# Patient Record
Sex: FEMALE | Race: WHITE | Employment: UNEMPLOYED | URBAN - METROPOLITAN AREA
[De-identification: names, ages, dates, MRNs, and addresses within clinical notes are randomized per-mention and may not be internally consistent; named-entity substitution may affect disease eponyms.]

---

## 2019-06-04 NOTE — PROGRESS NOTES
Obstructive Sleep Apnea (MARIA EUGENIA) Screening     Patient:  Dolly Gonzalez    YOB: 1975      Medical Record #:  [id number]                     Date:  6/4/2019     1. Are you a loud and/or regular snorer? []  Yes       [x] No    2. Have you been observed to gasp or stop breathing during sleep? []  Yes       [x] No    3. Do you feel tired or groggy upon awakening or do you awaken with a headache?           []  Yes       [] No    4. Are you often tired or fatigued during the wake time hours? []  Yes       [] No    5. Do you fall asleep sitting, reading, watching TV or driving? []  Yes       [] No    6. Do you often have problems with memory or concentration? []  Yes       [] No    **If patient's score is ? 3 they are considered high risk for MARIA EUGENIA. Notify the anesthesiologist of the high risk and document in focus note. Note:  If the patient's BMI is more than 35 kg m¯² , has neck circumference > 40 cm, and/or high blood pressure the risk is greater (© American [employer]nea Association, 2006).

## 2019-06-06 ENCOUNTER — ANESTHESIA EVENT (OUTPATIENT)
Dept: OPERATING ROOM | Age: 44
End: 2019-06-06
Payer: COMMERCIAL

## 2019-06-07 ENCOUNTER — ANESTHESIA (OUTPATIENT)
Dept: OPERATING ROOM | Age: 44
End: 2019-06-07
Payer: COMMERCIAL

## 2019-06-07 ENCOUNTER — HOSPITAL ENCOUNTER (OUTPATIENT)
Age: 44
Setting detail: OUTPATIENT SURGERY
Discharge: HOME | End: 2019-06-07
Attending: OTOLARYNGOLOGY | Admitting: OTOLARYNGOLOGY
Payer: COMMERCIAL

## 2019-06-07 VITALS
TEMPERATURE: 97.3 F | BODY MASS INDEX: 34.99 KG/M2 | OXYGEN SATURATION: 94 % | RESPIRATION RATE: 16 BRPM | DIASTOLIC BLOOD PRESSURE: 80 MMHG | HEART RATE: 91 BPM | WEIGHT: 210 LBS | SYSTOLIC BLOOD PRESSURE: 109 MMHG | HEIGHT: 65 IN

## 2019-06-07 VITALS
DIASTOLIC BLOOD PRESSURE: 68 MMHG | RESPIRATION RATE: 11 BRPM | SYSTOLIC BLOOD PRESSURE: 111 MMHG | OXYGEN SATURATION: 98 %

## 2019-06-07 DIAGNOSIS — Z01.818 PREOP TESTING: Primary | ICD-10-CM

## 2019-06-07 LAB — PREGNANCY, URINE: NEGATIVE

## 2019-06-07 PROCEDURE — 2709999900 HC NON-CHARGEABLE SUPPLY: Performed by: OTOLARYNGOLOGY

## 2019-06-07 PROCEDURE — 6360000002 HC RX W HCPCS: Performed by: ANESTHESIOLOGY

## 2019-06-07 PROCEDURE — 3700000001 HC ADD 15 MINUTES (ANESTHESIA): Performed by: OTOLARYNGOLOGY

## 2019-06-07 PROCEDURE — 2780000010 HC IMPLANT OTHER: Performed by: OTOLARYNGOLOGY

## 2019-06-07 PROCEDURE — 3700000000 HC ANESTHESIA ATTENDED CARE: Performed by: OTOLARYNGOLOGY

## 2019-06-07 PROCEDURE — 3600000002 HC SURGERY LEVEL 2 BASE: Performed by: OTOLARYNGOLOGY

## 2019-06-07 PROCEDURE — 6360000002 HC RX W HCPCS: Performed by: NURSE ANESTHETIST, CERTIFIED REGISTERED

## 2019-06-07 PROCEDURE — 6370000000 HC RX 637 (ALT 250 FOR IP): Performed by: ANESTHESIOLOGY

## 2019-06-07 PROCEDURE — 2500000003 HC RX 250 WO HCPCS: Performed by: NURSE ANESTHETIST, CERTIFIED REGISTERED

## 2019-06-07 PROCEDURE — 2580000003 HC RX 258: Performed by: ANESTHESIOLOGY

## 2019-06-07 PROCEDURE — 3600000012 HC SURGERY LEVEL 2 ADDTL 15MIN: Performed by: OTOLARYNGOLOGY

## 2019-06-07 PROCEDURE — 7100000000 HC PACU RECOVERY - FIRST 15 MIN: Performed by: OTOLARYNGOLOGY

## 2019-06-07 PROCEDURE — 2580000003 HC RX 258: Performed by: OTOLARYNGOLOGY

## 2019-06-07 PROCEDURE — 84703 CHORIONIC GONADOTROPIN ASSAY: CPT

## 2019-06-07 PROCEDURE — 6370000000 HC RX 637 (ALT 250 FOR IP): Performed by: OTOLARYNGOLOGY

## 2019-06-07 PROCEDURE — 7100000001 HC PACU RECOVERY - ADDTL 15 MIN: Performed by: OTOLARYNGOLOGY

## 2019-06-07 PROCEDURE — 7100000011 HC PHASE II RECOVERY - ADDTL 15 MIN: Performed by: OTOLARYNGOLOGY

## 2019-06-07 PROCEDURE — 7100000010 HC PHASE II RECOVERY - FIRST 15 MIN: Performed by: OTOLARYNGOLOGY

## 2019-06-07 DEVICE — VENT TUBE 1016020 5PK GOODE T GROM SIL
Type: IMPLANTABLE DEVICE | Site: EAR | Status: FUNCTIONAL
Brand: GOODE T-TUBE®

## 2019-06-07 RX ORDER — LABETALOL HYDROCHLORIDE 5 MG/ML
5 INJECTION, SOLUTION INTRAVENOUS
Status: DISCONTINUED | OUTPATIENT
Start: 2019-06-07 | End: 2019-06-07 | Stop reason: HOSPADM

## 2019-06-07 RX ORDER — HYDRALAZINE HYDROCHLORIDE 20 MG/ML
5 INJECTION INTRAMUSCULAR; INTRAVENOUS EVERY 30 MIN PRN
Status: DISCONTINUED | OUTPATIENT
Start: 2019-06-07 | End: 2019-06-07 | Stop reason: HOSPADM

## 2019-06-07 RX ORDER — SODIUM CHLORIDE 0.9 % (FLUSH) 0.9 %
10 SYRINGE (ML) INJECTION EVERY 12 HOURS SCHEDULED
Status: DISCONTINUED | OUTPATIENT
Start: 2019-06-07 | End: 2019-06-07 | Stop reason: HOSPADM

## 2019-06-07 RX ORDER — SODIUM CHLORIDE 0.9 % (FLUSH) 0.9 %
10 SYRINGE (ML) INJECTION PRN
Status: DISCONTINUED | OUTPATIENT
Start: 2019-06-07 | End: 2019-06-07 | Stop reason: HOSPADM

## 2019-06-07 RX ORDER — APREPITANT 40 MG/1
40 CAPSULE ORAL ONCE
Status: COMPLETED | OUTPATIENT
Start: 2019-06-07 | End: 2019-06-07

## 2019-06-07 RX ORDER — MIDAZOLAM HYDROCHLORIDE 1 MG/ML
2 INJECTION INTRAMUSCULAR; INTRAVENOUS
Status: COMPLETED | OUTPATIENT
Start: 2019-06-07 | End: 2019-06-07

## 2019-06-07 RX ORDER — OXYCODONE HYDROCHLORIDE AND ACETAMINOPHEN 5; 325 MG/1; MG/1
1 TABLET ORAL PRN
Status: COMPLETED | OUTPATIENT
Start: 2019-06-07 | End: 2019-06-07

## 2019-06-07 RX ORDER — MEPERIDINE HYDROCHLORIDE 50 MG/ML
12.5 INJECTION INTRAMUSCULAR; INTRAVENOUS; SUBCUTANEOUS EVERY 5 MIN PRN
Status: DISCONTINUED | OUTPATIENT
Start: 2019-06-07 | End: 2019-06-07 | Stop reason: HOSPADM

## 2019-06-07 RX ORDER — MAGNESIUM HYDROXIDE 1200 MG/15ML
LIQUID ORAL CONTINUOUS PRN
Status: COMPLETED | OUTPATIENT
Start: 2019-06-07 | End: 2019-06-07

## 2019-06-07 RX ORDER — ONDANSETRON 2 MG/ML
4 INJECTION INTRAMUSCULAR; INTRAVENOUS EVERY 30 MIN PRN
Status: DISCONTINUED | OUTPATIENT
Start: 2019-06-07 | End: 2019-06-07 | Stop reason: HOSPADM

## 2019-06-07 RX ORDER — ONDANSETRON 2 MG/ML
INJECTION INTRAMUSCULAR; INTRAVENOUS PRN
Status: DISCONTINUED | OUTPATIENT
Start: 2019-06-07 | End: 2019-06-07 | Stop reason: SDUPTHER

## 2019-06-07 RX ORDER — OXYCODONE HYDROCHLORIDE AND ACETAMINOPHEN 5; 325 MG/1; MG/1
2 TABLET ORAL PRN
Status: COMPLETED | OUTPATIENT
Start: 2019-06-07 | End: 2019-06-07

## 2019-06-07 RX ORDER — SODIUM CHLORIDE, SODIUM LACTATE, POTASSIUM CHLORIDE, CALCIUM CHLORIDE 600; 310; 30; 20 MG/100ML; MG/100ML; MG/100ML; MG/100ML
INJECTION, SOLUTION INTRAVENOUS CONTINUOUS
Status: DISCONTINUED | OUTPATIENT
Start: 2019-06-07 | End: 2019-06-07 | Stop reason: HOSPADM

## 2019-06-07 RX ORDER — DIPHENHYDRAMINE HYDROCHLORIDE 50 MG/ML
6.25 INJECTION INTRAMUSCULAR; INTRAVENOUS
Status: DISCONTINUED | OUTPATIENT
Start: 2019-06-07 | End: 2019-06-07 | Stop reason: HOSPADM

## 2019-06-07 RX ORDER — LIDOCAINE HYDROCHLORIDE 20 MG/ML
INJECTION, SOLUTION INFILTRATION; PERINEURAL PRN
Status: DISCONTINUED | OUTPATIENT
Start: 2019-06-07 | End: 2019-06-07 | Stop reason: SDUPTHER

## 2019-06-07 RX ORDER — LIDOCAINE HYDROCHLORIDE 10 MG/ML
1 INJECTION, SOLUTION EPIDURAL; INFILTRATION; INTRACAUDAL; PERINEURAL
Status: DISCONTINUED | OUTPATIENT
Start: 2019-06-07 | End: 2019-06-07 | Stop reason: HOSPADM

## 2019-06-07 RX ORDER — CIPROFLOXACIN AND DEXAMETHASONE 3; 1 MG/ML; MG/ML
SUSPENSION/ DROPS AURICULAR (OTIC) PRN
Status: DISCONTINUED | OUTPATIENT
Start: 2019-06-07 | End: 2019-06-07 | Stop reason: ALTCHOICE

## 2019-06-07 RX ORDER — PROPOFOL 10 MG/ML
INJECTION, EMULSION INTRAVENOUS PRN
Status: DISCONTINUED | OUTPATIENT
Start: 2019-06-07 | End: 2019-06-07 | Stop reason: SDUPTHER

## 2019-06-07 RX ORDER — ACETAMINOPHEN 500 MG
1000 TABLET ORAL ONCE
Status: COMPLETED | OUTPATIENT
Start: 2019-06-07 | End: 2019-06-07

## 2019-06-07 RX ADMIN — OXYCODONE HYDROCHLORIDE AND ACETAMINOPHEN 1 TABLET: 5; 325 TABLET ORAL at 08:14

## 2019-06-07 RX ADMIN — ONDANSETRON 4 MG: 2 INJECTION INTRAMUSCULAR; INTRAVENOUS at 07:18

## 2019-06-07 RX ADMIN — MIDAZOLAM HYDROCHLORIDE 2 MG: 2 INJECTION, SOLUTION INTRAMUSCULAR; INTRAVENOUS at 07:15

## 2019-06-07 RX ADMIN — ACETAMINOPHEN 1000 MG: 500 TABLET ORAL at 06:52

## 2019-06-07 RX ADMIN — HYDROMORPHONE HYDROCHLORIDE 0.5 MG: 1 INJECTION, SOLUTION INTRAMUSCULAR; INTRAVENOUS; SUBCUTANEOUS at 08:16

## 2019-06-07 RX ADMIN — APREPITANT 40 MG: 40 CAPSULE ORAL at 06:52

## 2019-06-07 RX ADMIN — LIDOCAINE HYDROCHLORIDE 40 MG: 20 INJECTION, SOLUTION INFILTRATION; PERINEURAL at 07:18

## 2019-06-07 RX ADMIN — PROPOFOL 200 MG: 10 INJECTION, EMULSION INTRAVENOUS at 07:18

## 2019-06-07 RX ADMIN — HYDROMORPHONE HYDROCHLORIDE 0.5 MG: 1 INJECTION, SOLUTION INTRAMUSCULAR; INTRAVENOUS; SUBCUTANEOUS at 08:03

## 2019-06-07 RX ADMIN — SODIUM CHLORIDE, POTASSIUM CHLORIDE, SODIUM LACTATE AND CALCIUM CHLORIDE: 600; 310; 30; 20 INJECTION, SOLUTION INTRAVENOUS at 06:44

## 2019-06-07 ASSESSMENT — PULMONARY FUNCTION TESTS
PIF_VALUE: 1
PIF_VALUE: 10
PIF_VALUE: 7
PIF_VALUE: 24
PIF_VALUE: 0
PIF_VALUE: 14
PIF_VALUE: 14
PIF_VALUE: 0
PIF_VALUE: 13
PIF_VALUE: 3
PIF_VALUE: 15
PIF_VALUE: 13
PIF_VALUE: 24
PIF_VALUE: 14
PIF_VALUE: 1
PIF_VALUE: 1
PIF_VALUE: 17
PIF_VALUE: 25
PIF_VALUE: 2
PIF_VALUE: 23
PIF_VALUE: 16
PIF_VALUE: 12
PIF_VALUE: 12
PIF_VALUE: 0

## 2019-06-07 ASSESSMENT — PAIN SCALES - GENERAL
PAINLEVEL_OUTOF10: 8
PAINLEVEL_OUTOF10: 0
PAINLEVEL_OUTOF10: 8
PAINLEVEL_OUTOF10: 9

## 2019-06-07 ASSESSMENT — PAIN - FUNCTIONAL ASSESSMENT: PAIN_FUNCTIONAL_ASSESSMENT: 0-10

## 2019-06-07 NOTE — H&P
H&P UPDATE    This is a 45 yo WF with a life long history of chronic ETD with recurrent serous otitis media and conductive hearing loss. She has had multiple sets of tubes in the past.  She presents for bilateral myringotomy and T-tube placement. Her ENT exam revealed left tympanic membrane retraction with CHICO and right with dull TM, reduced pressure volume and compliance with CHL of middle ear. Patient denies any change in medical history or physical exam (iincluding cardiopulmonary) since H&P by PCP.   Procedure, risks and benefits were discussed with patient and permit signed

## 2019-06-07 NOTE — OP NOTE
[facility]                                OPERATIVE REPORT    PATIENT NAME: Yordan Miranda                    :        1975  MED REC NO:   [id number]                          ROOM:  ACCOUNT NO:   [de-identified]                           ADMIT DATE: 2019  PROVIDER:     Davidson Mcelroy MD    DATE OF PROCEDURE:  2019    PREOPERATIVE DIAGNOSES:  Chronic eustachian tube dysfunction with  recurrent serous otitis media and conductive hearing loss, bilateral.    POSTOPERATIVE DIAGNOSES:  Chronic eustachian tube dysfunction with  recurrent serous otitis media and conductive hearing loss, bilateral.    SURGICAL PROCEDURE:  Bilateral myringotomy with T-tube placement. COMPLICATIONS:  None. INDICATIONS FOR SURGICAL PROCEDURE:  This is a 45-year-old white female  who has had a lifetime history of chronic eustachian tube dysfunction  and recurrent otitis media, recurrent serous otitis with conductive  hearing loss as well as multiple tympanostomy tubes. She presents at  this time for bilateral myringotomy and T-tube placement. OPERATIVE PROCEDURE:  The patient was taken to the operating room and  placed on the operating table in the supine position, where she  underwent induction of general anesthesia without complication. The  right ear was examined and the patient was prepped and draped in the  usual sterile fashion. The right ear was examined using an  intraoperative microscope and revealed retraction of the tympanic  membrane. There was a monomeric area in the posterior inferior  quadrant. A radial incision was made in the posterior inferior quadrant  with a myringotomy knife. No middle ear effusion was present. The  T-tube was placed without difficulty. Next, attention was directed to the left ear. The left ear examination  with the intraoperative microscope revealed tympanosclerosis.   A radial  incision was made in the posterior inferior quadrant with a myringotomy  knife revealed a serous middle ear effusion. A Dickson T-tube was  placed without difficulty and Ciprodex otic drops was instilled in both  the ears. The patient tolerated the procedure well without complications and less  than a mL of blood loss.         Yuliana Joiner MD    D: 06/07/2019 9:09:50       T: 06/07/2019 11:44:16     [email or web address]  Job#: [id number]     Doc#: [id number]    CC:

## 2019-06-07 NOTE — ANESTHESIA PRE PROCEDURE
Department of Anesthesiology  Preprocedure Note       Name:  Tato Delgado   Age:  44 y.o.  :  1975                                          MRN:  [id number]         Date:  2019      Surgeon: Stella Yung):  Glenn Chua MD    Procedure: BILATERAL MYRINGOTOMY WITH T-TUBE INSERTION (Bilateral Ear)    Medications prior to admission:   Prior to Admission medications    Medication Sig Start Date End Date Taking?  Authorizing Provider   metoprolol tartrate (LOPRESSOR) 25 MG tablet Take 50 mg by mouth nightly   Yes Historical Provider, MD   ALPRAZolam (XANAX) 0.25 MG tablet Take 0.25 mg by mouth nightly as needed for Sleep   Yes Historical Provider, MD   venlafaxine (EFFEXOR) 75 MG tablet Take 75 mg by mouth daily    Yes Historical Provider, MD   OnabotulinumtoxinA (BOTOX IJ) Inject as directed    Historical Provider, MD       Current medications:    Current Facility-Administered Medications   Medication Dose Route Frequency Provider Last Rate Last Dose    lactated ringers infusion   Intravenous Continuous Walt Mcbride  mL/hr at 19 0644      sodium chloride flush 0.9 % injection 10 mL  10 mL Intravenous 2 times per day Walt Mcbride MD        sodium chloride flush 0.9 % injection 10 mL  10 mL Intravenous PRN Walt Mcbride MD        lidocaine PF 1 % injection 1 mL  1 mL Intradermal Once PRN Walt Mcbride MD        HYDROmorphone (DILAUDID) injection 0.5 mg  0.5 mg Intravenous Q10 Min PRN Charles Stiles MD        HYDROmorphone (DILAUDID) injection 0.5 mg  0.5 mg Intravenous Q5 Min PRN Charles Stiles MD        oxyCODONE-acetaminophen (PERCOCET) 5-325 MG per tablet 1 tablet  1 tablet Oral PRN Charles Stiles MD        Or    oxyCODONE-acetaminophen (PERCOCET) 5-325 MG per tablet 2 tablet  2 tablet Oral PRN Charles Stiles MD        diphenhydrAMINE (BENADRYL) injection 6.25 mg  6.25 mg Intravenous Once PRN Charles Stiles MD        ondansetron [facility] injection 4 mg  4 mg Intravenous Q30 Min PRN Lionell Osler, MD        labetalol (NORMODYNE;TRANDATE) injection 5 mg  5 mg Intravenous Q15 Min PRN Lionell Osler, MD        hydrALAZINE (APRESOLINE) injection 5 mg  5 mg Intravenous Q30 Min PRN Lionell Osler, MD        meperidine (DEMEROL) injection 12.5 mg  12.5 mg Intravenous Q5 Min PRN Lionell Osler, MD        sodium chloride 0.9 % irrigation    Continuous PRN Treasure Zarate MD   5 mL at 19 2843     Facility-Administered Medications Ordered in Other Encounters   Medication Dose Route Frequency Provider Last Rate Last Dose    propofol injection    PRN Victor Manuel Earnest, APRN - CRNA   200 mg at 19 0718    lidocaine 2 % injection    PRN Victor Manuel Earnest, APRN - CRNA   40 mg at 19 0718    ondansetron (ZOFRAN) injection    PRN Victor Manuel Earnest, APRN - CRNA   4 mg at 19 5306       Allergies:  No Known Allergies    Problem List:  There is no problem list on file for this patient.       Past Medical History:        Diagnosis Date    Anxiety     Clonic hemifacial spasm of muscle of left side of face     Migraines        Past Surgical History:        Procedure Laterality Date     SECTION      CHOLECYSTECTOMY      KNEE ARTHROSCOPY Right     TYMPANOSTOMY TUBE PLACEMENT      numerous       Social History:    Social History     Tobacco Use    Smoking status: Former Smoker     Last attempt to quit: 2015     Years since quittin.0    Smokeless tobacco: Never Used    Tobacco comment: social   Substance Use Topics    Alcohol use: Yes     Comment: rarely                                Counseling given: Not Answered  Comment: social      Vital Signs (Current):   Vitals:    19 1155 19 0622   BP:  (!) 133/91   Pulse:  90   Resp:  16   Temp:  97.2 °F (36.2 °C)   TempSrc:  Temporal   SpO2:  95%   Weight: 210 lb (95.3 kg) 210 lb (95.3 kg)   Height: 5' 5\" (1.651 m) 5' 5\" (1.651 m) BP Readings from Last 3 Encounters:   06/07/19 (!) 133/91   03/17/17 134/87       NPO Status: Time of last liquid consumption: 1900                        Time of last solid consumption: 1900                        Date of last liquid consumption: 06/06/19                        Date of last solid food consumption: 06/06/19    BMI:   Wt Readings from Last 3 Encounters:   06/07/19 210 lb (95.3 kg)   03/17/17 210 lb (95.3 kg)     Body mass index is 34.95 kg/m². CBC:   Lab Results   Component Value Date    WBC 13.4 03/17/2017    RBC 4.78 03/17/2017    HGB 14.3 03/17/2017    HCT 42.8 03/17/2017    MCV 89.7 03/17/2017    RDW 13.5 03/17/2017     03/17/2017       CMP:   Lab Results   Component Value Date     03/17/2017    K 3.3 03/17/2017    CL 96 03/17/2017    CO2 22 03/17/2017    BUN 7 03/17/2017    CREATININE 0.6 03/17/2017    GFRAA >60 03/17/2017    AGRATIO 1.2 03/17/2017    LABGLOM >60 03/17/2017    GLUCOSE 108 03/17/2017    PROT 7.7 03/17/2017    CALCIUM 8.4 03/17/2017    BILITOT 0.5 03/17/2017    ALKPHOS 85 03/17/2017    AST 21 03/17/2017    ALT 15 03/17/2017       POC Tests: No results for input(s): POCGLU, POCNA, POCK, POCCL, POCBUN, POCHEMO, POCHCT in the last 72 hours. Coags: No results found for: PROTIME, INR, APTT    HCG (If Applicable):   Lab Results   Component Value Date    PREGTESTUR Negative 03/17/2017        ABGs: No results found for: PHART, PO2ART, CLP0XKQ, NNB9YDZ, BEART, D9TKDKNU     Type & Screen (If Applicable):  No results found for: LABABO, [address]    Anesthesia Evaluation  Patient summary reviewed and Nursing notes reviewed no history of anesthetic complications:   Airway: Mallampati: II     Neck ROM: full   Dental:          Pulmonary:                              Cardiovascular:                      Neuro/Psych:   (+) headaches:,             GI/Hepatic/Renal:            ROS comment: obesity.    Endo/Other:                     Abdominal: Vascular:                                        Anesthesia Plan      general     ASA 2       Induction: intravenous. Anesthetic plan and risks discussed with patient. Plan discussed with CRNA.                   Jaime Bangura MD   6/7/2019

## 2021-01-04 ENCOUNTER — APPOINTMENT (OUTPATIENT)
Dept: GENERAL RADIOLOGY | Age: 46
End: 2021-01-04
Payer: COMMERCIAL

## 2021-01-04 ENCOUNTER — HOSPITAL ENCOUNTER (EMERGENCY)
Age: 46
Discharge: HOME | End: 2021-01-04
Payer: COMMERCIAL

## 2021-01-04 VITALS
RESPIRATION RATE: 19 BRPM | HEART RATE: 93 BPM | BODY MASS INDEX: 34.99 KG/M2 | DIASTOLIC BLOOD PRESSURE: 94 MMHG | SYSTOLIC BLOOD PRESSURE: 136 MMHG | TEMPERATURE: 97.9 F | WEIGHT: 210 LBS | HEIGHT: 65 IN | OXYGEN SATURATION: 100 %

## 2021-01-04 DIAGNOSIS — R53.83 MALAISE AND FATIGUE: ICD-10-CM

## 2021-01-04 DIAGNOSIS — R53.81 MALAISE AND FATIGUE: ICD-10-CM

## 2021-01-04 DIAGNOSIS — U07.1 PNEUMONIA DUE TO COVID-19 VIRUS: Primary | ICD-10-CM

## 2021-01-04 DIAGNOSIS — R06.09 DOE (DYSPNEA ON EXERTION): ICD-10-CM

## 2021-01-04 DIAGNOSIS — J12.82 PNEUMONIA DUE TO COVID-19 VIRUS: Primary | ICD-10-CM

## 2021-01-04 LAB
A/G RATIO: 1.3 (ref 1.1–2.2)
ALBUMIN SERPL-MCNC: 4.4 G/DL (ref 3.4–5)
ALP BLD-CCNC: 87 U/L (ref 40–129)
ALT SERPL-CCNC: 11 U/L (ref 10–40)
ANION GAP SERPL CALCULATED.3IONS-SCNC: 11 MMOL/L (ref 3–16)
AST SERPL-CCNC: 20 U/L (ref 15–37)
BASOPHILS ABSOLUTE: 0 K/UL (ref 0–0.2)
BASOPHILS RELATIVE PERCENT: 0.4 %
BILIRUB SERPL-MCNC: 0.3 MG/DL (ref 0–1)
BUN BLDV-MCNC: 12 MG/DL (ref 7–20)
CALCIUM SERPL-MCNC: 8.6 MG/DL (ref 8.3–10.6)
CHLORIDE BLD-SCNC: 101 MMOL/L (ref 99–110)
CO2: 22 MMOL/L (ref 21–32)
CREAT SERPL-MCNC: 0.7 MG/DL (ref 0.6–1.1)
EOSINOPHILS ABSOLUTE: 0 K/UL (ref 0–0.6)
EOSINOPHILS RELATIVE PERCENT: 0.1 %
GFR AFRICAN AMERICAN: >60
GFR NON-AFRICAN AMERICAN: >60
GLOBULIN: 3.5 G/DL
GLUCOSE BLD-MCNC: 98 MG/DL (ref 70–99)
HCT VFR BLD CALC: 42.3 % (ref 36–48)
HEMOGLOBIN: 13.5 G/DL (ref 12–16)
LYMPHOCYTES ABSOLUTE: 1.7 K/UL (ref 1–5.1)
LYMPHOCYTES RELATIVE PERCENT: 28.3 %
MCH RBC QN AUTO: 25.5 PG (ref 26–34)
MCHC RBC AUTO-ENTMCNC: 32 G/DL (ref 31–36)
MCV RBC AUTO: 79.5 FL (ref 80–100)
MONOCYTES ABSOLUTE: 0.6 K/UL (ref 0–1.3)
MONOCYTES RELATIVE PERCENT: 9.9 %
NEUTROPHILS ABSOLUTE: 3.7 K/UL (ref 1.7–7.7)
NEUTROPHILS RELATIVE PERCENT: 61.3 %
PDW BLD-RTO: 17 % (ref 12.4–15.4)
PLATELET # BLD: 246 K/UL (ref 135–450)
PMV BLD AUTO: 7.7 FL (ref 5–10.5)
POTASSIUM SERPL-SCNC: 3.6 MMOL/L (ref 3.5–5.1)
RBC # BLD: 5.31 M/UL (ref 4–5.2)
SODIUM BLD-SCNC: 134 MMOL/L (ref 136–145)
TOTAL PROTEIN: 7.9 G/DL (ref 6.4–8.2)
TROPONIN: <0.01 NG/ML
WBC # BLD: 6.1 K/UL (ref 4–11)

## 2021-01-04 PROCEDURE — 93005 ELECTROCARDIOGRAM TRACING: CPT | Performed by: EMERGENCY MEDICINE

## 2021-01-04 PROCEDURE — 84484 ASSAY OF TROPONIN QUANT: CPT

## 2021-01-04 PROCEDURE — 96374 THER/PROPH/DIAG INJ IV PUSH: CPT

## 2021-01-04 PROCEDURE — 80053 COMPREHEN METABOLIC PANEL: CPT

## 2021-01-04 PROCEDURE — 71045 X-RAY EXAM CHEST 1 VIEW: CPT

## 2021-01-04 PROCEDURE — 85025 COMPLETE CBC W/AUTO DIFF WBC: CPT

## 2021-01-04 PROCEDURE — 6360000002 HC RX W HCPCS: Performed by: PHYSICIAN ASSISTANT

## 2021-01-04 PROCEDURE — 99285 EMERGENCY DEPT VISIT HI MDM: CPT

## 2021-01-04 RX ORDER — DEXAMETHASONE SODIUM PHOSPHATE 10 MG/ML
8 INJECTION INTRAMUSCULAR; INTRAVENOUS ONCE
Status: COMPLETED | OUTPATIENT
Start: 2021-01-04 | End: 2021-01-04

## 2021-01-04 RX ADMIN — DEXAMETHASONE SODIUM PHOSPHATE 8 MG: 10 INJECTION INTRAMUSCULAR; INTRAVENOUS at 22:33

## 2021-01-04 ASSESSMENT — ENCOUNTER SYMPTOMS
VOMITING: 0
ABDOMINAL PAIN: 0
BACK PAIN: 0
SHORTNESS OF BREATH: 1
COUGH: 1
NAUSEA: 0
COLOR CHANGE: 0
EYES NEGATIVE: 1

## 2021-01-04 ASSESSMENT — PAIN DESCRIPTION - LOCATION: LOCATION: CHEST

## 2021-01-04 ASSESSMENT — PAIN DESCRIPTION - DESCRIPTORS: DESCRIPTORS: PRESSURE

## 2021-01-05 ENCOUNTER — CARE COORDINATION (OUTPATIENT)
Dept: CARE COORDINATION | Age: 46
End: 2021-01-05

## 2021-01-05 LAB
EKG ATRIAL RATE: 88 BPM
EKG DIAGNOSIS: NORMAL
EKG P AXIS: 24 DEGREES
EKG P-R INTERVAL: 116 MS
EKG Q-T INTERVAL: 370 MS
EKG QRS DURATION: 74 MS
EKG QTC CALCULATION (BAZETT): 447 MS
EKG R AXIS: 26 DEGREES
EKG T AXIS: 34 DEGREES
EKG VENTRICULAR RATE: 88 BPM

## 2021-01-05 PROCEDURE — 93010 ELECTROCARDIOGRAM REPORT: CPT | Performed by: INTERNAL MEDICINE

## 2021-01-05 NOTE — ED PROVIDER NOTES
I did not have face-to-face interaction with this patient. I did not discuss the case with the advanced practice provider. I was available for consultation on the patient if deemed necessary by advanced practice provider. EKG  The Ekg interpreted by me shows  normal sinus rhythm with a rate of 88  Axis is   Normal  QTc is  normal  Intervals and Durations are unremarkable. ST Segments: normal  No prior EKG available for comparison.              Paco Soria MD  01/04/21 0683

## 2021-01-05 NOTE — CARE COORDINATION
Patient contacted regarding recent visit for viral symptoms. This [facility] contacted the patient by telephone to perform post discharge call. Call within 2 business days of discharge: Yes    Unable to leave message requesting a return call, VM box full. Will attempt to reach pt again.

## 2021-01-05 NOTE — ED PROVIDER NOTES
[facility]  ED  EMERGENCY DEPARTMENT ENCOUNTER        Pt Name: Krysten Arellano  MRN: [id number]  [place] 1975  Date of evaluation: 1/4/2021  Provider: Blayne Baxter PA-C  PCP: Mikala Restrepo MD  ED Attending: Edilia Jimenez      This patient was not seen by the attending provider     History provided by the patient    CHIEF COMPLAINT:     Chief Complaint   Patient presents with    Chest Pain     pain in middle of chest for 2-3 days, states HR has been elevated at home. 130-140 at highest.       HISTORY OF PRESENT ILLNESS:      Krysten Arellano is a 41 y.o. female who arrives to the ED by private vehicle. Patient is here with known COVID-19 infection. She is had symptoms for approximately 1 week and tested positive last Wednesday, 12/30. Patient reports she has not had a fever today, that has improved. However she continues to experience fatigue, generalized weakness, shortness of breath with activity and coughing. She reports some pain in her chest for the last couple of days and wanted to be evaluated. Patient reports no significant past medical history. She takes no medications routinely. She is a non-smoker. The patient symptoms are exacerbated with activity and slightly alleviated at rest.    Nursing Notes were reviewed     REVIEW OF SYSTEMS:     Review of Systems   Constitutional: Positive for activity change, appetite change and fatigue. Negative for chills and fever. HENT: Negative. Eyes: Negative. Respiratory: Positive for cough and shortness of breath. Cardiovascular: Positive for chest pain. Gastrointestinal: Negative for abdominal pain, nausea and vomiting. Genitourinary: Negative. Musculoskeletal: Negative for back pain and neck pain. Skin: Negative for color change. Neurological: Negative for dizziness and headaches. All other systems reviewed and are negative. Except as noted above in the ROS, all other systems were reviewed and negative. PAST MEDICAL HISTORY:     Past Medical History:   Diagnosis Date    Anxiety     Clonic hemifacial spasm of muscle of left side of face     Migraines          SURGICAL HISTORY:      Past Surgical History:   Procedure Laterality Date     SECTION      CHOLECYSTECTOMY      KNEE ARTHROSCOPY Right     MYRINGOTOMY AND TYMPANOSTOMY TUBE PLACEMENT Bilateral 2019    BILATERAL MYRINGOTOMY WITH T-TUBE INSERTION performed by Fransisco Hand MD at [address]      numerous         CURRENT MEDICATIONS:       Discharge Medication List as of 2021 10:51 PM      CONTINUE these medications which have NOT CHANGED    Details   metoprolol tartrate (LOPRESSOR) 25 MG tablet Take 50 mg by mouth nightlyHistorical Med      ALPRAZolam (XANAX) 0.25 MG tablet Take 0.25 mg by mouth nightly as needed for SleepHistorical Med      venlafaxine (EFFEXOR) 75 MG tablet Take 75 mg by mouth daily Historical Med      OnabotulinumtoxinA (BOTOX IJ) Inject as directedHistorical Med               ALLERGIES:    Patient has no known allergies.     FAMILY HISTORY:       Family History   Problem Relation Age of Onset    High Blood Pressure Mother     Other Mother         brain tumor - benign    Heart Disease Father         heart valve replacement          SOCIAL HISTORY:       Social History     Socioeconomic History    Marital status:      Spouse name: None    Number of children: None    Years of education: None    Highest education level: None   Occupational History    None   Social Needs    Financial resource strain: None    Food insecurity     Worry: None     Inability: None    Transportation needs     Medical: None     Non-medical: None   Tobacco Use    Smoking status: Former Smoker     Quit date: 2015     Years since quittin.5    Smokeless tobacco: Never Used    Tobacco comment: social   Substance and Sexual Activity    Alcohol use: Yes     Comment: rarely  Drug use: Never    Sexual activity: None   Lifestyle    Physical activity     Days per week: None     Minutes per session: None    Stress: None   Relationships    Social connections     Talks on phone: None     Gets together: None     Attends Synagogue service: None     Active member of club or organization: None     Attends meetings of clubs or organizations: None     Relationship status: None    Intimate partner violence     Fear of current or ex partner: None     Emotionally abused: None     Physically abused: None     Forced sexual activity: None   Other Topics Concern    None   Social History Narrative    None       SCREENINGS:    Marshall Coma Scale  Eye Opening: Spontaneous  Best Verbal Response: Oriented  Best Motor Response: Obeys commands  Rosa Coma Scale Score: 15        PHYSICAL EXAM:       ED Triage Vitals [01/04/21 1815]   BP Temp Temp Source Pulse Resp SpO2 Height Weight   (!) 143/99 97.9 °F (36.6 °C) Oral 95 18 98 % 5' 5\" (1.651 m) 210 lb (95.3 kg)       Physical Exam    CONSTITUTIONAL: Awake and alert. Cooperative. Well-developed. Well-nourished. Non-toxic. No acute distress. HENT: Normocephalic. Atraumatic. External ears normal, without discharge. No nasal discharge. Oropharynx clear. Mucous membranes moist.  EYES: Conjunctiva non-injected. No scleral icterus. PERRL. EOM's grossly intact. NECK: Supple. Normal ROM. CARDIOVASCULAR: RRR. No Murmer. Intact distal pulses. PULMONARY/CHEST WALL: Effort normal. No tachypnea. Lungs clear to ausculation. ABDOMEN: Normal BS. Soft. Nondistended. No tenderness to palpate. No guarding. /ANORECTAL: Not assessed  MUSKULOSKELETAL: Normal ROM. No acute deformities. No edema. No tenderness to palpate. SKIN: Warm and dry. No rash. NEUROLOGICAL: Alert and oriented x 3. GCS 15. CN II-XII grossly intact. Strength is 5/5 in all extremities and sensation is intact. Normal gait.   PSYCHIATRIC: Normal affect        DIAGNOSTICRESULTS: pneumothorax or pleural effusion. Elevated right hemidiaphragm with linear opacity at the right lung base possibly representing subsegmental atelectasis or pneumonia         EKG: The Ekg interpreted by me in the absence of a cardiologist shows. normal sinus rhythm with a rate of 88    See also interpretation by Nova Hurley MD      PROCEDURES:   N/A    CRITICAL CARE TIME:       None      CONSULTS:  None      EMERGENCY DEPARTMENT COURSE and DIFFERENTIAL DIAGNOSIS/MDM:   Vitals:    Vitals:    01/04/21 1815 01/04/21 2154 01/04/21 2226   BP: (!) 143/99 (!) 148/97 (!) 136/94   Pulse: 95 92 93   Resp: 18 18 19   Temp: 97.9 °F (36.6 °C)     TempSrc: Oral     SpO2: 98% 99% 100%   Weight: 210 lb (95.3 kg)     Height: 5' 5\" (1.651 m)         Patient was given the following medications:  Medications   dexamethasone (DECADRON) injection 8 mg (8 mg Intravenous Given 1/4/21 2233)       I have evaluated this patient in the ED  Old records were reviewed. Patient arrives to the ED with known COVID-19 infection. He has been sick for about a week. Her fevers are improving but she continues to have cough, chest tightness and shortness of breath and chest pain. Symptoms are worse with activity and she reports breaking out in a sweat at times. Patient appears well while sitting in the bed. Vital signs are normal.  She is not tachycardic, tachypneic or hypoxic. In fact oxygen saturations are 100% on room air. CBC normal with WBC 6.1, H&H 13.5 and 42.3  CMP unremarkable  Troponin negative  Portable chest x-ray shows elevated right hemidiaphragm with linear opacity at the right lung base possibly representing subsegmental atelectasis or pneumonia  EKG shows sinus rhythm with rate 88 bpm  Patient again has remained stable here. We ambulated her in the hallway and she did not become tachycardic or hypoxic. She is given Decadron 8 mg IV but ultimately will be discharged home to continue monitoring her symptoms.   Return precautions discussed. I estimate there is LOW risk for BACTERIAL PNEUMONIA, PNEUMOTHORAX, PULMONARY EMBOLISM, ACUTE CORONARY SYNDROME, OR THORACIC AORTIC DISSECTION, thus I consider the discharge disposition reasonable. [id number] [place] In[place]enc[place] and I have discussed the diagnosis and risks, and we agree with discharging home to follow-up with their primary doctor. We also discussed returning to the Emergency Department immediately if new or worsening symptoms occur. We have discussed the symptoms which are most concerning (e.g., bloody sputum, fever, worsening pain or shortness of breath, vomiting) that necessitate immediate return. FINAL IMPRESSION:      1. Pneumonia due to COVID-19 virus    2. CALDERON (dyspnea on exertion)    3.  Malaise and fatigue          DISPOSITION/PLAN:   DISPOSITION     DISCHARGE    PATIENT REFERRED TO:  Benita Tariq  [phone]    Schedule an appointment as soon as possible for a visit       [facility]  ED  [address]  Go to   If symptoms worsen      DISCHARGE MEDICATIONS:  Discharge Medication List as of 1/4/2021 10:51 PM                     (Please note thatportions of this note were completed with a voice recognition program.  Efforts were made to edit the dictations, but occasionally words are mis-transcribed.)    Oralia Dinero PA-C (electronicallysigned)              Tristan Medranoma  01/04/21 2893

## 2021-01-05 NOTE — ED NOTES
Pt ambulated approximately 250 feet around unit. Pt tolerated well. SpO2 remained % on room air, -110.       Almita Lal RN  01/04/21 1949

## 2021-01-06 NOTE — CARE COORDINATION
Patient contacted regarding recent visit for viral symptoms. This [facility] contacted the patient by telephone to perform post discharge call. Call within 2 business days of discharge: Yes    Left HIPAA compliant message for spouse, Sameer Whitley requesting a return call. Pt mobile phones VM box is full. No further attempts will be made, will wait for a return call.

## 2021-08-28 ENCOUNTER — APPOINTMENT (OUTPATIENT)
Dept: GENERAL RADIOLOGY | Age: 46
End: 2021-08-28
Payer: COMMERCIAL

## 2021-08-28 ENCOUNTER — HOSPITAL ENCOUNTER (EMERGENCY)
Age: 46
Discharge: HOME | End: 2021-08-28
Attending: STUDENT IN AN ORGANIZED HEALTH CARE EDUCATION/TRAINING PROGRAM
Payer: COMMERCIAL

## 2021-08-28 VITALS
BODY MASS INDEX: 36.32 KG/M2 | WEIGHT: 218 LBS | OXYGEN SATURATION: 100 % | HEIGHT: 65 IN | HEART RATE: 79 BPM | RESPIRATION RATE: 18 BRPM | TEMPERATURE: 98.5 F | SYSTOLIC BLOOD PRESSURE: 185 MMHG | DIASTOLIC BLOOD PRESSURE: 119 MMHG

## 2021-08-28 DIAGNOSIS — G43.009 MIGRAINE WITHOUT AURA AND WITHOUT STATUS MIGRAINOSUS, NOT INTRACTABLE: ICD-10-CM

## 2021-08-28 DIAGNOSIS — R07.89 CHEST PRESSURE: ICD-10-CM

## 2021-08-28 DIAGNOSIS — I10 ESSENTIAL HYPERTENSION: Primary | ICD-10-CM

## 2021-08-28 LAB
ANION GAP SERPL CALCULATED.3IONS-SCNC: 11 MMOL/L (ref 3–16)
ATYPICAL LYMPHOCYTE RELATIVE PERCENT: 1 % (ref 0–6)
BASOPHILS ABSOLUTE: 0 K/UL (ref 0–0.2)
BASOPHILS RELATIVE PERCENT: 0 %
BILIRUBIN URINE: NEGATIVE
BLOOD, URINE: NEGATIVE
BUN BLDV-MCNC: 19 MG/DL (ref 7–20)
CALCIUM SERPL-MCNC: 9.2 MG/DL (ref 8.3–10.6)
CHLORIDE BLD-SCNC: 102 MMOL/L (ref 99–110)
CLARITY: CLEAR
CO2: 26 MMOL/L (ref 21–32)
COLOR: YELLOW
CREAT SERPL-MCNC: 0.8 MG/DL (ref 0.6–1.1)
EOSINOPHILS ABSOLUTE: 0 K/UL (ref 0–0.6)
EOSINOPHILS RELATIVE PERCENT: 0 %
GFR AFRICAN AMERICAN: >60
GFR NON-AFRICAN AMERICAN: >60
GLUCOSE BLD-MCNC: 99 MG/DL (ref 70–99)
GLUCOSE URINE: NEGATIVE MG/DL
HCT VFR BLD CALC: 41.8 % (ref 36–48)
HEMOGLOBIN: 14 G/DL (ref 12–16)
KETONES, URINE: NEGATIVE MG/DL
LEUKOCYTE ESTERASE, URINE: NEGATIVE
LYMPHOCYTES ABSOLUTE: 7 K/UL (ref 1–5.1)
LYMPHOCYTES RELATIVE PERCENT: 42 %
MCH RBC QN AUTO: 29.3 PG (ref 26–34)
MCHC RBC AUTO-ENTMCNC: 33.4 G/DL (ref 31–36)
MCV RBC AUTO: 87.5 FL (ref 80–100)
MICROSCOPIC EXAMINATION: NORMAL
MONOCYTES ABSOLUTE: 1.3 K/UL (ref 0–1.3)
MONOCYTES RELATIVE PERCENT: 8 %
MYELOCYTE PERCENT: 2 %
NEUTROPHILS ABSOLUTE: 7.9 K/UL (ref 1.7–7.7)
NEUTROPHILS RELATIVE PERCENT: 47 %
NITRITE, URINE: NEGATIVE
PDW BLD-RTO: 15 % (ref 12.4–15.4)
PH UA: 7 (ref 5–8)
PLATELET # BLD: 384 K/UL (ref 135–450)
PMV BLD AUTO: 7.6 FL (ref 5–10.5)
POTASSIUM SERPL-SCNC: 3.4 MMOL/L (ref 3.5–5.1)
PRO-BNP: 151 PG/ML (ref 0–124)
PROTEIN UA: NEGATIVE MG/DL
RBC # BLD: 4.77 M/UL (ref 4–5.2)
RBC # BLD: NORMAL 10*6/UL
SODIUM BLD-SCNC: 139 MMOL/L (ref 136–145)
SPECIFIC GRAVITY UA: 1.01 (ref 1–1.03)
TROPONIN: <0.01 NG/ML
TROPONIN: <0.01 NG/ML
URINE REFLEX TO CULTURE: NORMAL
URINE TYPE: NORMAL
UROBILINOGEN, URINE: 0.2 E.U./DL
WBC # BLD: 16.2 K/UL (ref 4–11)

## 2021-08-28 PROCEDURE — 96374 THER/PROPH/DIAG INJ IV PUSH: CPT

## 2021-08-28 PROCEDURE — 80048 BASIC METABOLIC PNL TOTAL CA: CPT

## 2021-08-28 PROCEDURE — 71046 X-RAY EXAM CHEST 2 VIEWS: CPT

## 2021-08-28 PROCEDURE — 96375 TX/PRO/DX INJ NEW DRUG ADDON: CPT

## 2021-08-28 PROCEDURE — 83880 ASSAY OF NATRIURETIC PEPTIDE: CPT

## 2021-08-28 PROCEDURE — 99283 EMERGENCY DEPT VISIT LOW MDM: CPT

## 2021-08-28 PROCEDURE — 93005 ELECTROCARDIOGRAM TRACING: CPT | Performed by: STUDENT IN AN ORGANIZED HEALTH CARE EDUCATION/TRAINING PROGRAM

## 2021-08-28 PROCEDURE — 81003 URINALYSIS AUTO W/O SCOPE: CPT

## 2021-08-28 PROCEDURE — 85025 COMPLETE CBC W/AUTO DIFF WBC: CPT

## 2021-08-28 PROCEDURE — 6370000000 HC RX 637 (ALT 250 FOR IP): Performed by: STUDENT IN AN ORGANIZED HEALTH CARE EDUCATION/TRAINING PROGRAM

## 2021-08-28 PROCEDURE — 84484 ASSAY OF TROPONIN QUANT: CPT

## 2021-08-28 PROCEDURE — 6360000002 HC RX W HCPCS: Performed by: STUDENT IN AN ORGANIZED HEALTH CARE EDUCATION/TRAINING PROGRAM

## 2021-08-28 RX ORDER — HYDROCHLOROTHIAZIDE 25 MG/1
25 TABLET ORAL ONCE
Status: COMPLETED | OUTPATIENT
Start: 2021-08-28 | End: 2021-08-28

## 2021-08-28 RX ORDER — DIPHENHYDRAMINE HCL 25 MG
25 TABLET ORAL ONCE
Status: COMPLETED | OUTPATIENT
Start: 2021-08-28 | End: 2021-08-28

## 2021-08-28 RX ORDER — METOCLOPRAMIDE HYDROCHLORIDE 5 MG/ML
10 INJECTION INTRAMUSCULAR; INTRAVENOUS ONCE
Status: COMPLETED | OUTPATIENT
Start: 2021-08-28 | End: 2021-08-28

## 2021-08-28 RX ORDER — KETOROLAC TROMETHAMINE 30 MG/ML
15 INJECTION, SOLUTION INTRAMUSCULAR; INTRAVENOUS ONCE
Status: COMPLETED | OUTPATIENT
Start: 2021-08-28 | End: 2021-08-28

## 2021-08-28 RX ORDER — HYDROCHLOROTHIAZIDE 12.5 MG/1
12.5 TABLET ORAL DAILY
Qty: 30 TABLET | Refills: 0 | Status: SHIPPED | OUTPATIENT
Start: 2021-08-28 | End: 2021-09-27

## 2021-08-28 RX ORDER — HYDROCODONE BITARTRATE AND ACETAMINOPHEN 5; 325 MG/1; MG/1
1 TABLET ORAL EVERY 8 HOURS PRN
COMMUNITY

## 2021-08-28 RX ADMIN — DIPHENHYDRAMINE HYDROCHLORIDE 25 MG: 25 TABLET ORAL at 23:19

## 2021-08-28 RX ADMIN — KETOROLAC TROMETHAMINE 15 MG: 30 INJECTION, SOLUTION INTRAMUSCULAR; INTRAVENOUS at 23:18

## 2021-08-28 RX ADMIN — HYDROCHLOROTHIAZIDE 25 MG: 25 TABLET ORAL at 23:19

## 2021-08-28 RX ADMIN — METOCLOPRAMIDE HYDROCHLORIDE 10 MG: 5 INJECTION INTRAMUSCULAR; INTRAVENOUS at 23:17

## 2021-08-28 ASSESSMENT — PAIN DESCRIPTION - DESCRIPTORS: DESCRIPTORS: CONSTANT

## 2021-08-28 ASSESSMENT — PAIN DESCRIPTION - PAIN TYPE: TYPE: ACUTE PAIN

## 2021-08-28 ASSESSMENT — PAIN DESCRIPTION - FREQUENCY: FREQUENCY: CONTINUOUS

## 2021-08-28 ASSESSMENT — HEART SCORE: ECG: 0

## 2021-08-28 ASSESSMENT — PAIN DESCRIPTION - LOCATION: LOCATION: HEAD;CHEST

## 2021-08-28 ASSESSMENT — PAIN SCALES - GENERAL
PAINLEVEL_OUTOF10: 4
PAINLEVEL_OUTOF10: 4

## 2021-08-29 LAB
EKG ATRIAL RATE: 78 BPM
EKG DIAGNOSIS: NORMAL
EKG P AXIS: 47 DEGREES
EKG P-R INTERVAL: 128 MS
EKG Q-T INTERVAL: 368 MS
EKG QRS DURATION: 74 MS
EKG QTC CALCULATION (BAZETT): 419 MS
EKG R AXIS: 13 DEGREES
EKG T AXIS: 49 DEGREES
EKG VENTRICULAR RATE: 78 BPM

## 2021-08-29 NOTE — ED PROVIDER NOTES
[address] ENCOUNTER          PHYSICIAN ASSISTANT NOTE     Date of evaluation: 2021    Chief Complaint     Hypertension (Pt reports elevated blood pressure ('s), headache, CP \"pressure\" since yesterday), Headache, and Chest Pain      History of Present Illness     Michael Degroot is a 40 y.o. female with a history of anxiety, migraines, and hypertension presents today for evaluation of hypertension. Patient states for the past 3 weeks her blood pressure is up trended to the 696 systolic range. She takes low-dose metoprolol for her blood pressure but this has not brought it down effectively. Over the past 3 days, she feels that she has been experiencing some right sided chest pressure that is present at almost all times in addition to some shortness of breath while walking upstairs. She denies a history of similar symptoms. She denies a history of DVT/PE, she does not smoke, denies any recent travel or COVID-19-like symptoms. Her primary care doctor recommended that she come to the emergency department today given her persistent high blood pressure and shortness of breath. She also endorses a mild headache. This improves when her blood pressure improves. Review of Systems     As documented in the HPI, otherwise all other systems were reviewed and were negative. Past Medical, Surgical, Family, and Social History     She has a past medical history of Anxiety, Clonic hemifacial spasm of muscle of left side of face, and Migraines. She has a past surgical history that includes  section (); Knee arthroscopy (Right); Tympanostomy tube placement; Cholecystectomy (); and Myringotomy Tympanostomy Tube Placement (Bilateral, 2019). Her family history includes Heart Disease in her father; High Blood Pressure in her mother; Other in her mother. She reports that she quit smoking about 6 years ago.  She has never used smokeless tobacco. She reports current alcohol use. She reports that she does not use drugs. Medications     Previous Medications    HYDROCODONE-ACETAMINOPHEN (NORCO) 5-325 MG PER TABLET    Take 1 tablet by mouth every 8 hours as needed for Pain. METOPROLOL TARTRATE (LOPRESSOR) 25 MG TABLET    Take 25 mg by mouth nightly     ONABOTULINUMTOXINA (BOTOX IJ)    Inject as directed    VENLAFAXINE (EFFEXOR) 75 MG TABLET    Take 75 mg by mouth daily        Allergies     She has No Known Allergies. Physical Exam     INITIAL VITALS: BP: (!) 215/127, Temp: 98.5 °F (36.9 °C), Pulse: 87, Resp: 18, SpO2: 98 %     General: Well appearing, nontoxic female    HEENT:  Normocephalic, atraumatic. EOM intact with no nystagmus. Pupils equal, sclera white. Handling secretions without difficulty. Neck: No meningismus. Trachea midline. No carotid bruits. Pulmonary: Respirations even. Non labored. No tachypnea. Good air movement throughout    Cardiac: Chest symmetrical and non-tender on palpation of chest wall. RRR. no M/R/G. Abdomen:  Non-distended. Bowel sounds present in all quadrants. Non rigid and non tender to palpation. Musculoskeletal:  Ambulates under own control. Atraumatic exam with no focal swelling or tenderness. No peripheral edema. Strength 5/5 in all four extremities. Neuro:  Alert and oriented x 3. CN II - XII grossly intact. Moves all extremities spontaneously. Vascular:  2+ peripheral pulses in bilateral upper and lower extremities      Skin:  Warm and well perfused without rashes or lesions    Psych:  Appropriate mood and affect        Diagnostic Results     EKG   Interpreted in conjunction with emergency department physician No att. providers found  Indication: Chest pressure and shortness of breath  Impression: Normal sinus rhythm, heart rate 78 bpm, stable axis, nonacute nonischemic EKG without     RADIOLOGY:  XR CHEST (2 VW)   Final Result      1.   No findings for acute cardiopulmonary disease in this underinflated chest.

## 2021-09-07 ENCOUNTER — HOSPITAL ENCOUNTER (OUTPATIENT)
Dept: NON INVASIVE DIAGNOSTICS | Age: 46
Discharge: HOME | End: 2021-09-07
Payer: COMMERCIAL

## 2021-09-07 DIAGNOSIS — R07.89 CHEST PRESSURE: ICD-10-CM

## 2021-09-07 LAB
LV EF: 55 %
LVEF MODALITY: NORMAL

## 2021-09-07 PROCEDURE — 93017 CV STRESS TEST TRACING ONLY: CPT

## 2021-09-07 PROCEDURE — 93350 STRESS TTE ONLY: CPT

## 2021-09-07 PROCEDURE — 93320 DOPPLER ECHO COMPLETE: CPT

## 2021-09-15 ENCOUNTER — OFFICE VISIT (OUTPATIENT)
Dept: CARDIOLOGY CLINIC | Age: 46
End: 2021-09-15
Payer: COMMERCIAL

## 2021-09-15 VITALS
BODY MASS INDEX: 36.88 KG/M2 | SYSTOLIC BLOOD PRESSURE: 164 MMHG | DIASTOLIC BLOOD PRESSURE: 100 MMHG | WEIGHT: 221.6 LBS | HEART RATE: 100 BPM

## 2021-09-15 DIAGNOSIS — R60.1 GENERALIZED EDEMA: ICD-10-CM

## 2021-09-15 DIAGNOSIS — I10 HTN (HYPERTENSION), BENIGN: Primary | ICD-10-CM

## 2021-09-15 PROCEDURE — 99214 OFFICE O/P EST MOD 30 MIN: CPT | Performed by: INTERNAL MEDICINE

## 2021-09-15 RX ORDER — LOSARTAN POTASSIUM AND HYDROCHLOROTHIAZIDE 12.5; 5 MG/1; MG/1
1 TABLET ORAL DAILY
Qty: 90 TABLET | Refills: 3 | Status: SHIPPED | OUTPATIENT
Start: 2021-09-15 | End: 2022-11-01

## 2021-09-15 NOTE — PROGRESS NOTES
[address]         Reason for Consultation/Chief Complaint: \"I have been having HTN. \"       History of Present Illness:  Kymberly Miller is a 40 y.o. patient who presented to the hospital with complaints of HTN. Had sciatica and was getting medrol and bp went very high. No chest pain. Has ear tubes. Past Medical History:   has a past medical history of Anxiety, Clonic hemifacial spasm of muscle of left side of face, and Migraines. Surgical History:   has a past surgical history that includes  section (); Knee arthroscopy (Right); Tympanostomy tube placement; Cholecystectomy (); and Myringotomy Tympanostomy Tube Placement (Bilateral, 2019). Social History:   reports that she quit smoking about 6 years ago. She has never used smokeless tobacco. She reports current alcohol use. She reports that she does not use drugs. Family History:  No evidence for sudden cardiac death or premature CAD    Home Medications:  Were reviewed and are listed in nursing record. and/or listed below  Prior to Admission medications    Medication Sig Start Date End Date Taking? Authorizing Provider   HYDROcodone-acetaminophen (NORCO) 5-325 MG per tablet Take 1 tablet by mouth every 8 hours as needed for Pain. Yes Historical Provider, MD   hydroCHLOROthiazide (HYDRODIURIL) 12.5 MG tablet Take 1 tablet by mouth daily 21 Yes Mikhail Hurley MD   metoprolol tartrate (LOPRESSOR) 25 MG tablet Take 25 mg by mouth nightly    Yes Historical Provider, MD   venlafaxine (EFFEXOR) 75 MG tablet Take 75 mg by mouth daily    Yes Historical Provider, MD   OnabotulinumtoxinA (BOTOX IJ) Inject as directed   Yes Historical Provider, MD        Hospital Medications: Allergies:  Patient has no known allergies. Review of Systems:     A 14 ROS obtained and negative except as mentioned in HPI. · Constitutional: there has been no unanticipated weight loss. · Eyes: No visual changes or diplopia. No scleral icterus. · ENT: No Headaches, hearing loss or vertigo. No mouth sores or sore throat. · Cardiovascular: No loss of consciousness. No hemoptysis, pleuritic pain, or phlebitis. · Respiratory: No cough or wheezing, no sputum production. No hematemesis. · Gastrointestinal: No abdominal pain, appetite loss, blood in stools. No change in bowel or bladder habits. · Genitourinary: No dysuria, or hematuria. · Musculoskeletal:  No gait disturbance, weakness or joint complaints. · Integumentary: No rash or pruritis. · Neurological: No headache, diplopia,numbness or tingling. No change in gait, balance, coordination, mood, affect, memory, mentation, behavior. · Psychiatric: No anxiety,  · Endocrine: No malaise,  · Hematologic/Lymphatic: No abnormal bruising  · Allergic/Immunologic: No nasal congestion      Physical Examination:    Vitals:    09/15/21 1347   BP: (!) 164/100   Pulse: 100    Weight: 221 lb 9.6 oz (100.5 kg)         General Appearance:  Alert, cooperative, no distress, appears stated age   Head:  Normocephalic, without obvious abnormality, atraumatic   Eyes:  PERRL   Nose: Nares normal,   Neck: Supple, JVP normal   Lungs:   Clear to auscultation bilaterally, respirations unlabored   Chest Wall:  No tenderness or deformity   Heart:  Regular rate and rhythm, normal S1, S2 normal, no murmur,  No rub. No S3 / S4  gallop   Abdomen:   Soft, non-tender, +bowel sounds   Extremities: no cyanosis, no clubbing , No edema   Pulses: Symmetric extremities   Skin: no gross lesions or rashes   Pysch: Normal mood and affect   Neurologic: No gross deficits.   CN II - XII grossly intact        Labs  CBC:   Lab Results   Component Value Date    WBC 16.2 08/28/2021    RBC 4.77 08/28/2021    HGB 14.0 08/28/2021    HCT 41.8 08/28/2021    MCV 87.5 08/28/2021    RDW 15.0 08/28/2021     08/28/2021     CMP:    Lab Results   Component Value Date     08/28/2021    K 3.4 08/28/2021     08/28/2021    CO2 26 08/28/2021    BUN 19 08/28/2021    CREATININE 0.8 08/28/2021    GFRAA >60 08/28/2021    AGRATIO 1.3 01/04/2021    LABGLOM >60 08/28/2021    GLUCOSE 99 08/28/2021    PROT 7.9 01/04/2021    CALCIUM 9.2 08/28/2021    BILITOT 0.3 01/04/2021    ALKPHOS 87 01/04/2021    AST 20 01/04/2021    ALT 11 01/04/2021     PT/INR:  No results found for: PTINR  No results for input(s): CKTOTAL, CKMB, TROPONINI in the last 72 hours. EKG:  SR  No acute changes        Impression:       Diagnosis Orders   1. HTN (hypertension), benign     2. Generalized edema           Recommendations:    I had the opportunity to review the clinical symptoms and presentation of Brittany Yi. I recommend that the patient undergo no further cardiac evaluation. Negative stress echo. Continue toprol 50 mg at bedtime and HCTz 12.5 mg daily and add hyzaar 50/12.5 mg dialy. Bmp 2 weeks  Return 2 months. No tobacco use. Thank you for allowing to us to participate in the care or Brittany Yi. All questions and concerns were addressed to the patient/family. Alternatives to my treatment were discussed. The note was completed using EMR. Every effort was made to ensure accuracy; however, inadvertent computerized transcription errors may be present.        Dian Saint, MD [facility] [place]

## 2022-09-02 ENCOUNTER — APPOINTMENT (OUTPATIENT)
Dept: GENERAL RADIOLOGY | Age: 47
End: 2022-09-02
Payer: COMMERCIAL

## 2022-09-02 ENCOUNTER — HOSPITAL ENCOUNTER (EMERGENCY)
Age: 47
Discharge: HOME | End: 2022-09-02
Attending: STUDENT IN AN ORGANIZED HEALTH CARE EDUCATION/TRAINING PROGRAM
Payer: COMMERCIAL

## 2022-09-02 ENCOUNTER — APPOINTMENT (OUTPATIENT)
Dept: CT IMAGING | Age: 47
End: 2022-09-02
Payer: COMMERCIAL

## 2022-09-02 ENCOUNTER — TELEPHONE (OUTPATIENT)
Dept: CARDIOLOGY CLINIC | Age: 47
End: 2022-09-02

## 2022-09-02 VITALS
SYSTOLIC BLOOD PRESSURE: 117 MMHG | OXYGEN SATURATION: 96 % | TEMPERATURE: 99.1 F | WEIGHT: 215.61 LBS | HEART RATE: 78 BPM | HEIGHT: 65 IN | DIASTOLIC BLOOD PRESSURE: 83 MMHG | RESPIRATION RATE: 16 BRPM | BODY MASS INDEX: 35.92 KG/M2

## 2022-09-02 DIAGNOSIS — I10 ESSENTIAL HYPERTENSION: Primary | ICD-10-CM

## 2022-09-02 DIAGNOSIS — R51.9 ACUTE NONINTRACTABLE HEADACHE, UNSPECIFIED HEADACHE TYPE: ICD-10-CM

## 2022-09-02 LAB
ANION GAP SERPL CALCULATED.3IONS-SCNC: 13 MMOL/L (ref 3–16)
BASOPHILS ABSOLUTE: 0.1 K/UL (ref 0–0.2)
BASOPHILS RELATIVE PERCENT: 0.6 %
BUN BLDV-MCNC: 17 MG/DL (ref 7–20)
CALCIUM SERPL-MCNC: 9.5 MG/DL (ref 8.3–10.6)
CHLORIDE BLD-SCNC: 100 MMOL/L (ref 99–110)
CO2: 23 MMOL/L (ref 21–32)
CREAT SERPL-MCNC: 0.7 MG/DL (ref 0.6–1.1)
EKG ATRIAL RATE: 83 BPM
EKG DIAGNOSIS: NORMAL
EKG P AXIS: 59 DEGREES
EKG P-R INTERVAL: 112 MS
EKG Q-T INTERVAL: 358 MS
EKG QRS DURATION: 72 MS
EKG QTC CALCULATION (BAZETT): 420 MS
EKG R AXIS: 38 DEGREES
EKG T AXIS: 56 DEGREES
EKG VENTRICULAR RATE: 83 BPM
EOSINOPHILS ABSOLUTE: 0.1 K/UL (ref 0–0.6)
EOSINOPHILS RELATIVE PERCENT: 0.7 %
GFR AFRICAN AMERICAN: >60
GFR NON-AFRICAN AMERICAN: >60
GLUCOSE BLD-MCNC: 95 MG/DL (ref 70–99)
HCT VFR BLD CALC: 47.6 % (ref 36–48)
HEMOGLOBIN: 15.7 G/DL (ref 12–16)
LYMPHOCYTES ABSOLUTE: 3.8 K/UL (ref 1–5.1)
LYMPHOCYTES RELATIVE PERCENT: 23.3 %
MCH RBC QN AUTO: 29.3 PG (ref 26–34)
MCHC RBC AUTO-ENTMCNC: 33 G/DL (ref 31–36)
MCV RBC AUTO: 88.6 FL (ref 80–100)
MONOCYTES ABSOLUTE: 1.4 K/UL (ref 0–1.3)
MONOCYTES RELATIVE PERCENT: 8.8 %
NEUTROPHILS ABSOLUTE: 10.9 K/UL (ref 1.7–7.7)
NEUTROPHILS RELATIVE PERCENT: 66.6 %
PDW BLD-RTO: 13.8 % (ref 12.4–15.4)
PLATELET # BLD: 434 K/UL (ref 135–450)
PMV BLD AUTO: 8.2 FL (ref 5–10.5)
POTASSIUM REFLEX MAGNESIUM: 4.2 MMOL/L (ref 3.5–5.1)
PRO-BNP: 31 PG/ML (ref 0–124)
RBC # BLD: 5.37 M/UL (ref 4–5.2)
SODIUM BLD-SCNC: 136 MMOL/L (ref 136–145)
TROPONIN: <0.01 NG/ML
WBC # BLD: 16.3 K/UL (ref 4–11)

## 2022-09-02 PROCEDURE — 84484 ASSAY OF TROPONIN QUANT: CPT

## 2022-09-02 PROCEDURE — 71046 X-RAY EXAM CHEST 2 VIEWS: CPT

## 2022-09-02 PROCEDURE — 83880 ASSAY OF NATRIURETIC PEPTIDE: CPT

## 2022-09-02 PROCEDURE — 99285 EMERGENCY DEPT VISIT HI MDM: CPT

## 2022-09-02 PROCEDURE — 85025 COMPLETE CBC W/AUTO DIFF WBC: CPT

## 2022-09-02 PROCEDURE — 80048 BASIC METABOLIC PNL TOTAL CA: CPT

## 2022-09-02 PROCEDURE — 70450 CT HEAD/BRAIN W/O DYE: CPT

## 2022-09-02 PROCEDURE — 36415 COLL VENOUS BLD VENIPUNCTURE: CPT

## 2022-09-02 PROCEDURE — 93005 ELECTROCARDIOGRAM TRACING: CPT | Performed by: PHYSICIAN ASSISTANT

## 2022-09-02 ASSESSMENT — PAIN SCALES - GENERAL: PAINLEVEL_OUTOF10: 5

## 2022-09-02 ASSESSMENT — PAIN DESCRIPTION - LOCATION: LOCATION: ARM

## 2022-09-02 ASSESSMENT — PAIN DESCRIPTION - PAIN TYPE: TYPE: ACUTE PAIN

## 2022-09-02 ASSESSMENT — PAIN - FUNCTIONAL ASSESSMENT: PAIN_FUNCTIONAL_ASSESSMENT: 0-10

## 2022-09-02 ASSESSMENT — PAIN DESCRIPTION - ONSET: ONSET: GRADUAL

## 2022-09-02 ASSESSMENT — PAIN DESCRIPTION - ORIENTATION: ORIENTATION: LEFT

## 2022-09-02 ASSESSMENT — PAIN DESCRIPTION - DESCRIPTORS: DESCRIPTORS: ACHING

## 2022-09-02 ASSESSMENT — PAIN DESCRIPTION - FREQUENCY: FREQUENCY: CONTINUOUS

## 2022-09-02 NOTE — ED NOTES
Patient prepared for and ready to be discharged. Patient discharged at this time in no acute distress after verbalizing understanding of discharge instructions. Patient left after receiving After Visit Summary instructions.         Duran Crook RN  09/02/22 [id number]

## 2022-09-02 NOTE — TELEPHONE ENCOUNTER
Pt states she was covid positive last Saturday and was given Prednisone to take. She states that she was hospitalized last November with the same issue and it was found that the Prednisone was the cause of the hypertension. Pt states she has not been taking her blood pressure medications as ordered. She did take a Lopressor last night and a Losartan this am.  Pt states she woke up sweaty and had a headache last nite. Pt's current blood pressure is 192/119. Pt denies chest pain, but does complain of  left arm pain.   Will address with Dr Travis Park

## 2022-09-02 NOTE — ED PROVIDER NOTES
[address] [facility] [address] ENCOUNTER          ATTENDING PHYSICIAN NOTE       Date of evaluation: 9/2/2022    Chief Complaint     Hypertension (States sweaty last night with headache and left arm achiness)      History of Present Illness     Misa Smith is a 56 y.o. female with history of migraines, hypertension, chronic back pain seen by outpatient spine team presenting with high blood pressure. Patient states that she was previously on metoprolol and losartan/hydrochlorothiazide but self discontinued these medications. She reports that she was recently seen in cardiology clinic and noted to be hypertensive with blood pressures in the 682A systolic over 943A diastolic. Patient states that she recently restarted her home blood pressure medications and stopped taking prednisone which has caused hypertension for her in the past.  Patient states that she contacted her cardiologist today after waking up last night with a headache and sweats. She also states that she has had intermittent achiness over the left side of her chest and arm but denies any chest pain or exertional symptoms. Patient notes that she was recently diagnosed with COVID and treated as an outpatient for sinus infection with Augmentin but denies any fevers or new infectious symptoms. Denies any neck pain or stiffness. Denies any vision changes and reports near resolution of headache. Denies any numbness, weakness or tingling in her extremities. Review of Systems     Review of Systems   Constitutional:  Negative for chills and fever. HENT:  Positive for congestion. Negative for sore throat. Eyes:  Negative for pain and visual disturbance. Respiratory:  Negative for cough and shortness of breath. Cardiovascular:  Negative for chest pain. Gastrointestinal:  Negative for abdominal pain, diarrhea, nausea and vomiting. Genitourinary:  Negative for dysuria and frequency.    Musculoskeletal:  Negative for arthralgias and myalgias. Skin:  Negative for rash. Neurological:  Positive for headaches. Negative for syncope. Hematological:  Does not bruise/bleed easily. Psychiatric/Behavioral:  Negative for confusion. Past Medical, Surgical, Family, and Social History     She has a past medical history of Anxiety, Clonic hemifacial spasm of muscle of left side of face, and Migraines. She has a past surgical history that includes  section (); Knee arthroscopy (Right); Tympanostomy tube placement; Cholecystectomy (); and Myringotomy Tympanostomy Tube Placement (Bilateral, 2019). Her family history includes Heart Disease in her father; High Blood Pressure in her mother; Other in her mother. She reports that she quit smoking about 7 years ago. She has never used smokeless tobacco. She reports current alcohol use. She reports that she does not use drugs. Medications     Discharge Medication List as of 2022  5:02 PM        CONTINUE these medications which have NOT CHANGED    Details   losartan-hydroCHLOROthiazide (HYZAAR) 50-12.5 MG per tablet Take 1 tablet by mouth daily, Disp-90 tablet, R-3Normal      HYDROcodone-acetaminophen (NORCO) 5-325 MG per tablet Take 1 tablet by mouth every 8 hours as needed for Pain. Historical Med      hydroCHLOROthiazide (HYDRODIURIL) 12.5 MG tablet Take 1 tablet by mouth daily, Disp-30 tablet, R-0Normal      metoprolol tartrate (LOPRESSOR) 25 MG tablet Take 25 mg by mouth nightly Historical Med      venlafaxine (EFFEXOR) 75 MG tablet Take 75 mg by mouth daily Historical Med      OnabotulinumtoxinA (BOTOX IJ) Inject as directedHistorical Med             Allergies     She has No Known Allergies. Physical Exam     INITIAL VITALS: BP: (!) 180/139, Temp: 99.1 °F (37.3 °C), Heart Rate: 86, Resp: 16, SpO2: 96 %   Physical Exam  GENERAL: Awake, alert. No acute distress. Well appeaing  HEENT: Normocephalic, atraumatic. PERRL.  Conjunctiva clear, EOMI, no eye drainage. External ear normal. Nares patent with no drainage. Mucous membranes moist. Neck is supple, with full ROM. No meningismus. CARDIOVASCULAR: RRR, extremities warm and appear well-perfused. No peripheral edema  RESPIRATORY: No increased WOB, good air movement, breath sounds CTAB, no wheezes, rhonchi, or crackles noted. GI/ABDOMEN: Soft, non-distended, non-tender, normal bowel sounds, no rebound, no guarding. MSK/EXTR: No deformities or cyanosis noted. SKIN: Warm and dry, no rashes. NEURO: No focal neurologic deficits, moving all four extremities. No gross cranial nerve abnormalities. Patient notes having chronic left-sided facial spasms which are normal for her and not increased in frequency or severity. 5/5 strength in all extremities with intact sensation to light touch bilaterally. No pronator drift. PSYCH: Normal affect, answers questions appropriately. Diagnostic Results     EKG   EKG reviewed interpreted by me shows normal sinus rhythm with rate of 83, narrow QRS, normal axis with no ST elevations or depressions with wandering baseline. RADIOLOGY:  CT Head W/O Contrast   Final Result      No evidence of acute intracranial abnormality. XR CHEST (2 VW)   Final Result      Elevated right hemidiaphragm with right basilar scarring. Left lung is clear. Normal cardiomediastinal silhouette.           LABS:   Results for orders placed or performed during the hospital encounter of 09/02/22   BMP w/ Reflex to MG   Result Value Ref Range    Sodium 136 136 - 145 mmol/L    Potassium reflex Magnesium 4.2 3.5 - 5.1 mmol/L    Chloride 100 99 - 110 mmol/L    CO2 23 21 - 32 mmol/L    Anion Gap 13 3 - 16    Glucose 95 70 - 99 mg/dL    BUN 17 7 - 20 mg/dL    Creatinine 0.7 0.6 - 1.1 mg/dL    GFR Non-African American >60 >60    GFR African American >60 >60    Calcium 9.5 8.3 - 10.6 mg/dL   CBC with Auto Differential   Result Value Ref Range    WBC 16.3 (H) 4.0 - 11.0 K/uL    RBC 5.37 (H) 4.00 - 5.20 M/uL    Hemoglobin 15.7 12.0 - 16.0 g/dL    Hematocrit 47.6 36.0 - 48.0 %    MCV 88.6 80.0 - 100.0 fL    MCH 29.3 26.0 - 34.0 pg    MCHC 33.0 31.0 - 36.0 g/dL    RDW 13.8 12.4 - 15.4 %    Platelets 728 252 - 192 K/uL    MPV 8.2 5.0 - 10.5 fL    Neutrophils % 66.6 %    Lymphocytes % 23.3 %    Monocytes % 8.8 %    Eosinophils % 0.7 %    Basophils % 0.6 %    Neutrophils Absolute 10.9 (H) 1.7 - 7.7 K/uL    Lymphocytes Absolute 3.8 1.0 - 5.1 K/uL    Monocytes Absolute 1.4 (H) 0.0 - 1.3 K/uL    Eosinophils Absolute 0.1 0.0 - 0.6 K/uL    Basophils Absolute 0.1 0.0 - 0.2 K/uL   Brain Natriuretic Peptide   Result Value Ref Range    Pro-BNP 31 0 - 124 pg/mL   Troponin   Result Value Ref Range    Troponin <0.01 <0.01 ng/mL   EKG 12 Lead   Result Value Ref Range    Ventricular Rate 83 BPM    Atrial Rate 83 BPM    P-R Interval 112 ms    QRS Duration 72 ms    Q-T Interval 358 ms    QTc Calculation (Bazett) 420 ms    P Axis 59 degrees    R Axis 38 degrees    T Axis 56 degrees    Diagnosis       EKG performed in ER and to be interpreted by ER physician. Confirmed by MD, ER ([id number]),  Diandra Leiva ([id number]) on 9/2/2022 12:52:14 PM       ED BEDSIDE ULTRASOUND:  No results found. RECENT VITALS:  BP: 117/83,Temp: 99.1 °F (37.3 °C), Heart Rate: 78, Resp: 16, SpO2: 96 %     Procedures         ED Course     Nursing Notes, Past Medical Hx, Past Surgical Hx, Social Hx,Allergies, and Family Hx were reviewed. ED Course as of 09/03/22 0005   Fri Sep 02, 2022   1346 Pro-BNP: 31 [ED]   1346 Creatinine: 0.7 [ED]   1346 WBC(!): 16.3 [ED]   1415 Troponin: <0.01 [ED]      ED Course User Index  [ED] Daisha Lucero MD       patient was given the following medications:  No orders of the defined types were placed in this encounter.       CONSULTS:  None    MEDICAL DECISIONMAKING / ASSESSMENT / PLAN     Emiliano Jose is a 56 y.o. female with above-stated history presenting with concern for hypertension with intermittent headache and chest aching sensation. Well-appearing in no acute distress. Differential concerning for but not limited to hypertensive emergency, hypertensive urgency, hypertension, migraine, subarachnoid hemorrhage, ACS. Patient initially hypertensive to the 180s on arrival which improved on its own to 130s over 100 in the emergency department. Patient reports that she had recently restarted her home blood pressure medications 2 days ago. While in the ER, patient stated that she had no chest pain, vision changes. Given history of recent hypertension and headache that woke her from sleep, noncontrasted head CT was obtained and did not show any acute intracranial abnormality. Results of the CT scan were discussed with patient as well as the possibility of small subarachnoid hemorrhage as source of headache. I discussed with her that the sensitivity of CAT scan in detecting subarachnoid hemorrhage decreases after 6 hours and stated that after 6 hours, additional testing including LP can be performed. After discussing risks and benefits of lumbar puncture, patient expressed concern due to abnormal spinal anatomy and low back problems at baseline and stated she did not wish to undergo LP at this time. I discussed risk of not obtaining LP including possibility of subarachnoid hemorrhage not seen on CT scan. After shared decision-making with patient, decided to defer LP with strict return precautions including headache, vision changes, neck pain or stiffness or other neurologic symptoms. Patient continued to deny any chest pain or shortness of breath. Heart score of 2. Single troponin was obtained in setting of duration of chest chest discomfort described as ache without pain and was negative. CXR showed elevated hemidiaphragm seen on prior xray from 20121 which was discussed with patient at bedside with plan to follow-up with her outpatient provider.   Given that patient was well-appearing with stable vitals, reassuring exam, improvement of her blood pressure while observed in the ER after taking blood pressure medications prior to ED arrival, reassuring labs and imaging, plan for patient to follow-up as an outpatient with her primary care doctor and cardiologist.  Patient was advised to call 911 and return to the emergency department immediately for new or worsening symptoms. Patient expressed understanding and was amenable to plan. All questions were answered prior to discharge. Clinical Impression     1. Essential hypertension    2.  Acute nonintractable headache, unspecified headache type        Disposition     PATIENT REFERRED TO:  Patricia Randall, [address] [place] [address]  [phone]    Call in 1 day      DISCHARGE MEDICATIONS:  Discharge Medication List as of 9/2/2022  5:02 PM          DISPOSITION Decision To Discharge 09/02/2022 04:57:18 PM         Jeanie Stanley MD  09/03/22 0005

## 2022-09-02 NOTE — TELEPHONE ENCOUNTER
Pt's blood pressure yesterday was 215/145. Unaware of HR. Today its running 160/102. Woke up sweaty. Pt was covid positive last Saturday.      Please advise [phone]

## 2022-09-03 ASSESSMENT — ENCOUNTER SYMPTOMS
SHORTNESS OF BREATH: 0
ABDOMINAL PAIN: 0
EYE PAIN: 0
DIARRHEA: 0
NAUSEA: 0
COUGH: 0
SORE THROAT: 0
VOMITING: 0

## 2022-11-01 RX ORDER — LOSARTAN POTASSIUM AND HYDROCHLOROTHIAZIDE 12.5; 5 MG/1; MG/1
TABLET ORAL
Qty: 90 TABLET | Refills: 2 | Status: SHIPPED | OUTPATIENT
Start: 2022-11-01

## 2023-02-02 ENCOUNTER — OFFICE VISIT (OUTPATIENT)
Dept: CARDIOLOGY CLINIC | Age: 48
End: 2023-02-02
Payer: COMMERCIAL

## 2023-02-02 VITALS
BODY MASS INDEX: 36.34 KG/M2 | SYSTOLIC BLOOD PRESSURE: 148 MMHG | HEART RATE: 75 BPM | DIASTOLIC BLOOD PRESSURE: 90 MMHG | WEIGHT: 218.4 LBS

## 2023-02-02 DIAGNOSIS — I10 HTN (HYPERTENSION), BENIGN: Primary | ICD-10-CM

## 2023-02-02 DIAGNOSIS — R00.2 PALPITATIONS: ICD-10-CM

## 2023-02-02 PROCEDURE — 3077F SYST BP >= 140 MM HG: CPT | Performed by: INTERNAL MEDICINE

## 2023-02-02 PROCEDURE — 93000 ELECTROCARDIOGRAM COMPLETE: CPT | Performed by: INTERNAL MEDICINE

## 2023-02-02 PROCEDURE — 99214 OFFICE O/P EST MOD 30 MIN: CPT | Performed by: INTERNAL MEDICINE

## 2023-02-02 PROCEDURE — 3080F DIAST BP >= 90 MM HG: CPT | Performed by: INTERNAL MEDICINE

## 2023-02-02 RX ORDER — METOPROLOL SUCCINATE 25 MG/1
25 TABLET, EXTENDED RELEASE ORAL DAILY
Qty: 30 TABLET | Refills: 5 | Status: SHIPPED | OUTPATIENT
Start: 2023-02-02

## 2023-02-02 NOTE — PROGRESS NOTES
[address]  CARDIOLOGY  FOLLOW UP NOTE        Reason for Consultation/Chief Complaint: \"I have been having HTN. \"       History of Present Illness:  Topher Zaragoza is a 56 y.o. patient who presented to the hospital with complaints of HTN. Had sciatica and was getting medrol and bp went very high. No chest pain. Has ear tubes. Has some palpitations at night    Past Medical History:   has a past medical history of Anxiety, Clonic hemifacial spasm of muscle of left side of face, and Migraines. Surgical History:   has a past surgical history that includes  section (); Knee arthroscopy (Right); Tympanostomy tube placement; Cholecystectomy (); and Myringotomy Tympanostomy Tube Placement (Bilateral, 2019). Social History:   reports that she quit smoking about 7 years ago. Her smoking use included cigarettes. She has never used smokeless tobacco. She reports current alcohol use. She reports that she does not use drugs. Family History:  No evidence for sudden cardiac death or premature CAD    Home Medications:  Were reviewed and are listed in nursing record. and/or listed below  Prior to Admission medications    Medication Sig Start Date End Date Taking? Authorizing Provider   losartan-hydroCHLOROthiazide (HYZAAR) 50-12.5 MG per tablet TAKE ONE TABLET BY MOUTH DAILY 22  Yes Tito Massey MD   HYDROcodone-acetaminophen (NORCO) 5-325 MG per tablet Take 1 tablet by mouth every 8 hours as needed for Pain.    Yes Historical Provider, MD   OnabotulinumtoxinA (BOTOX IJ) Inject as directed   Yes Historical Provider, MD   hydroCHLOROthiazide (HYDRODIURIL) 12.5 MG tablet Take 1 tablet by mouth daily 21  Arianne June MD   metoprolol tartrate (LOPRESSOR) 25 MG tablet Take 25 mg by mouth nightly     Historical Provider, MD   venlafaxine (EFFEXOR) 75 MG tablet Take 75 mg by mouth daily     Historical Provider, MD CRISTIANO PASCUAL Medications: Allergies:  Patient has no known allergies. Review of Systems:     A 14 ROS obtained and negative except as mentioned in HPI. Constitutional: there has been no unanticipated weight loss. Eyes: No visual changes or diplopia. No scleral icterus. ENT: No Headaches, hearing loss or vertigo. No mouth sores or sore throat. Cardiovascular: No loss of consciousness. No hemoptysis, pleuritic pain, or phlebitis. Respiratory: No cough or wheezing, no sputum production. No hematemesis. Gastrointestinal: No abdominal pain, appetite loss, blood in stools. No change in bowel or bladder habits. Genitourinary: No dysuria, or hematuria. Musculoskeletal:  No gait disturbance, weakness or joint complaints. Integumentary: No rash or pruritis. Neurological: No headache, diplopia,numbness or tingling. No change in gait, balance, coordination, mood, affect, memory, mentation, behavior. Psychiatric: No anxiety,  Endocrine: No malaise,  Hematologic/Lymphatic: No abnormal bruising  Allergic/Immunologic: No nasal congestion      Physical Examination:    Vitals:    02/02/23 1629   BP: (!) 148/90   Pulse: 75    Weight: 218 lb 6.4 oz (99.1 kg)         General Appearance:  Alert, cooperative, no distress, appears stated age   Head:  Normocephalic, without obvious abnormality, atraumatic   Eyes:  PERRL   Nose: Nares normal,   Neck: Supple, JVP normal   Lungs:   Clear to auscultation bilaterally, respirations unlabored   Chest Wall:  No tenderness or deformity   Heart:  Regular rate and rhythm, normal S1, S2 normal, no murmur,  No rub. No S3 / S4  gallop   Abdomen:   Soft, non-tender, +bowel sounds   Extremities: no cyanosis, no clubbing , No edema   Pulses: Symmetric extremities   Skin: no gross lesions or rashes   Pysch: Normal mood and affect   Neurologic: No gross deficits.   CN II - XII grossly intact        Labs  CBC:   Lab Results   Component Value Date/Time    WBC 16.3 09/02/2022 01:11 PM    RBC 5.37 09/02/2022 01:11 PM    HGB 15.7 09/02/2022 01:11 PM    HCT 47.6 09/02/2022 01:11 PM    MCV 88.6 09/02/2022 01:11 PM    RDW 13.8 09/02/2022 01:11 PM     09/02/2022 01:11 PM     CMP:    Lab Results   Component Value Date/Time     09/02/2022 01:11 PM    K 4.2 09/02/2022 01:11 PM     09/02/2022 01:11 PM    CO2 23 09/02/2022 01:11 PM    BUN 17 09/02/2022 01:11 PM    CREATININE 0.7 09/02/2022 01:11 PM    GFRAA >60 09/02/2022 01:11 PM    AGRATIO 1.3 01/04/2021 09:46 PM    LABGLOM >60 09/02/2022 01:11 PM    GLUCOSE 95 09/02/2022 01:11 PM    PROT 7.9 01/04/2021 09:46 PM    CALCIUM 9.5 09/02/2022 01:11 PM    BILITOT 0.3 01/04/2021 09:46 PM    ALKPHOS 87 01/04/2021 09:46 PM    AST 20 01/04/2021 09:46 PM    ALT 11 01/04/2021 09:46 PM     PT/INR:  No results found for: PTINR  No results for input(s): CKTOTAL, CKMB, TROPONINI in the last 72 hours.    EKG:  SR  No acute changes        Impression:       Diagnosis Orders   1. HTN (hypertension), benign  EKG 12 Lead      2. Palpitations              Recommendations:    I had the opportunity to review the clinical symptoms and presentation of Gayle Padilla.     I recommend that the patient undergo no further cardiac evaluation.  Negative stress echo. Start toprol 12.5 mg at bedtime and add hyzaar 50/12.5 mg dialy.    No tobacco use.    Thank you for allowing to us to participate in the care or Gayle Padilla.     All questions and concerns were addressed to the patient/family. Alternatives to my treatment were discussed. The note was completed using EMR. Every effort was made to ensure accuracy; however, inadvertent computerized transcription errors may be present.       Mike Naranjo MD [facility]

## 2023-04-29 DIAGNOSIS — R00.2 PALPITATIONS: ICD-10-CM

## 2023-04-29 DIAGNOSIS — I10 HTN (HYPERTENSION), BENIGN: ICD-10-CM

## 2023-04-29 LAB
BASOPHILS # BLD: 0 K/UL (ref 0–0.2)
BASOPHILS NFR BLD: 0.6 %
CHOLEST SERPL-MCNC: 191 MG/DL (ref 0–199)
DEPRECATED RDW RBC AUTO: 13.8 % (ref 12.4–15.4)
EOSINOPHIL # BLD: 0.1 K/UL (ref 0–0.6)
EOSINOPHIL NFR BLD: 1.1 %
HCT VFR BLD AUTO: 46.1 % (ref 36–48)
HDLC SERPL-MCNC: 64 MG/DL (ref 40–60)
HGB BLD-MCNC: 15.3 G/DL (ref 12–16)
LDLC SERPL CALC-MCNC: 99 MG/DL
LYMPHOCYTES # BLD: 1.8 K/UL (ref 1–5.1)
LYMPHOCYTES NFR BLD: 25.8 %
MCH RBC QN AUTO: 29.3 PG (ref 26–34)
MCHC RBC AUTO-ENTMCNC: 33.2 G/DL (ref 31–36)
MCV RBC AUTO: 88.5 FL (ref 80–100)
MONOCYTES # BLD: 0.7 K/UL (ref 0–1.3)
MONOCYTES NFR BLD: 10.4 %
NEUTROPHILS # BLD: 4.4 K/UL (ref 1.7–7.7)
NEUTROPHILS NFR BLD: 62.1 %
PLATELET # BLD AUTO: 346 K/UL (ref 135–450)
PMV BLD AUTO: 8.2 FL (ref 5–10.5)
RBC # BLD AUTO: 5.21 M/UL (ref 4–5.2)
TRIGL SERPL-MCNC: 142 MG/DL (ref 0–150)
VLDLC SERPL CALC-MCNC: 28 MG/DL
WBC # BLD AUTO: 7.1 K/UL (ref 4–11)

## 2023-10-06 ENCOUNTER — OFFICE VISIT (OUTPATIENT)
Dept: CARDIOLOGY CLINIC | Age: 48
End: 2023-10-06
Payer: COMMERCIAL

## 2023-10-06 VITALS
WEIGHT: 223.8 LBS | HEART RATE: 75 BPM | SYSTOLIC BLOOD PRESSURE: 128 MMHG | DIASTOLIC BLOOD PRESSURE: 70 MMHG | BODY MASS INDEX: 37.24 KG/M2

## 2023-10-06 DIAGNOSIS — I10 HTN (HYPERTENSION), BENIGN: ICD-10-CM

## 2023-10-06 DIAGNOSIS — E78.5 HYPERLIPIDEMIA, UNSPECIFIED HYPERLIPIDEMIA TYPE: Primary | ICD-10-CM

## 2023-10-06 PROCEDURE — 3074F SYST BP LT 130 MM HG: CPT | Performed by: INTERNAL MEDICINE

## 2023-10-06 PROCEDURE — 99214 OFFICE O/P EST MOD 30 MIN: CPT | Performed by: INTERNAL MEDICINE

## 2023-10-06 PROCEDURE — 3078F DIAST BP <80 MM HG: CPT | Performed by: INTERNAL MEDICINE

## 2023-10-06 NOTE — PROGRESS NOTES
[facility]  CARDIOLOGY  FOLLOW UP NOTE        Reason for Consultation/Chief Complaint: \"I have been having HTN. \"       History of Present Illness:  Chio Connell is a 53 y.o. patient who presented to the hospital with complaints of HTN. Had sciatica and was getting medrol and bp went very high. No chest pain. Has ear tubes. Has some palpitations at night. Works at [employer]. Past Medical History:   has a past medical history of Anxiety, Clonic hemifacial spasm of muscle of left side of face, and Migraines. Surgical History:   has a past surgical history that includes  section (); Knee arthroscopy (Right); Tympanostomy tube placement; Cholecystectomy (); and Myringotomy Tympanostomy Tube Placement (Bilateral, 2019). Social History:   reports that she quit smoking about 8 years ago. Her smoking use included cigarettes. She has never used smokeless tobacco. She reports current alcohol use. She reports that she does not use drugs. Family History:  No evidence for sudden cardiac death or premature CAD    Home Medications:  Were reviewed and are listed in nursing record. and/or listed below  Prior to Admission medications    Medication Sig Start Date End Date Taking? Authorizing Provider   metoprolol succinate (TOPROL XL) 25 MG extended release tablet Take 1 tablet by mouth daily 23  Yes Trisha Sewell MD   losartan-hydroCHLOROthiazide (HYZAAR) 50-12.5 MG per tablet TAKE ONE TABLET BY MOUTH DAILY 22  Yes Trisha Sewell MD   HYDROcodone-acetaminophen (NORCO) 5-325 MG per tablet Take 1 tablet by mouth every 8 hours as needed for Pain.    Yes ProviderKeyanna MD   OnabotulinumtoxinA (BOTOX IJ) Inject as directed   Yes Keyanna Frank MD   hydroCHLOROthiazide (HYDRODIURIL) 12.5 MG tablet Take 1 tablet by mouth daily 21  Mart Inman MD   metoprolol tartrate (LOPRESSOR) 25 MG tablet Take 25 mg by mouth nightly     Provider,

## 2024-01-30 NOTE — TELEPHONE ENCOUNTER
Requested Prescriptions     Pending Prescriptions Disp Refills    losartan-hydroCHLOROthiazide (HYZAAR) 50-12.5 MG per tablet [Pharmacy Med Name: LOSARTAN-HCTZ 50-12.5 MG TAB] 90 tablet 3     Sig: TAKE ONE TABLET BY MOUTH DAILY            Last Office Visit: 10/6/2023     Next Office Visit: 4/8/2024       Last Labs09.21.2023 CMP,CBC results from [facility]

## 2024-02-14 RX ORDER — LOSARTAN POTASSIUM AND HYDROCHLOROTHIAZIDE 12.5; 5 MG/1; MG/1
TABLET ORAL
Qty: 90 TABLET | Refills: 3 | OUTPATIENT
Start: 2024-02-14

## 2024-03-19 RX ORDER — LOSARTAN POTASSIUM AND HYDROCHLOROTHIAZIDE 12.5; 5 MG/1; MG/1
1 TABLET ORAL DAILY
Qty: 90 TABLET | Refills: 3 | Status: SHIPPED | OUTPATIENT
Start: 2024-03-19

## 2024-03-19 RX ORDER — LOSARTAN POTASSIUM AND HYDROCHLOROTHIAZIDE 12.5; 5 MG/1; MG/1
1 TABLET ORAL DAILY
Qty: 90 TABLET | Refills: 3 | OUTPATIENT
Start: 2024-03-19

## 2024-03-26 ENCOUNTER — OFFICE VISIT (OUTPATIENT)
Dept: CARDIOLOGY CLINIC | Age: 49
End: 2024-03-26
Payer: COMMERCIAL

## 2024-03-26 VITALS
SYSTOLIC BLOOD PRESSURE: 110 MMHG | WEIGHT: 206 LBS | HEIGHT: 65 IN | BODY MASS INDEX: 34.32 KG/M2 | DIASTOLIC BLOOD PRESSURE: 70 MMHG | HEART RATE: 84 BPM

## 2024-03-26 DIAGNOSIS — R07.9 CHEST PAIN, UNSPECIFIED TYPE: Primary | ICD-10-CM

## 2024-03-26 DIAGNOSIS — R06.02 SOB (SHORTNESS OF BREATH): ICD-10-CM

## 2024-03-26 PROCEDURE — 99214 OFFICE O/P EST MOD 30 MIN: CPT | Performed by: NURSE PRACTITIONER

## 2024-03-26 PROCEDURE — 93000 ELECTROCARDIOGRAM COMPLETE: CPT | Performed by: NURSE PRACTITIONER

## 2024-03-26 RX ORDER — ACETAMINOPHEN 500 MG
500 TABLET ORAL EVERY 6 HOURS PRN
COMMUNITY

## 2024-03-26 RX ORDER — OMEGA-3/DHA/EPA/FISH OIL 60 MG-90MG
1 CAPSULE ORAL
COMMUNITY

## 2024-03-26 RX ORDER — LORAZEPAM 2 MG/1
0.5 TABLET ORAL PRN
COMMUNITY

## 2024-03-26 NOTE — PROGRESS NOTES
[facility]     Outpatient Follow Up Note  Dr Leon Barber MD,  FACC   Angie Cee RN, APRN,CNP CVNP      CHIEF COMPLAINT / HPI: Gayle Padilla is 48 y.o. female who presents today with hx HTN sciatica issues anxiety migraines obesity facial spasm hx Mobile palsy she is medical assistance in neuro at [place] non tobacco use no alcohol or illicit drugs,     Interval history:  VSS wt down 17# / was taking Saxenda for wt loss, has been out of it for a month   EKG today NSR with mild ST T wave diffuse changes    She c/o mid to left  chest discomfort wax and wanes over 2 days, sitting at desk, walking  or stress last seconds to minutes /left arm tingles, (of note, she has back problems, disc issues)   feels her b/p is wnl when discomfort occurs   No n/v diarrhea fever cough PND or MARIA EUGENIA    Blood work CTA cardiac      stress echo Normal sinus rhythm. No symptoms with exercise. Baseline resting echocardiogram shows normal global LV systolic function with an ejection fraction of 55% and uniform myocardial segmental wall motion. Following stress there was uniform augmentation of all myocardial segments with appropriate hyperdynamic LV systolic response to stress with an estimated ejection fraction of 70%. There is trivial mitral regurgitation present. This is a negative Stress Echocardiography study for ischemia.    I spent a total of 50 minutes and greater than 50% of the time was spent counseling with patient  coordinating care regarding her diagnosis, reviewing labs, cardiac work up, treatments and plan of care.    Past Medical History:   Diagnosis Date    Anxiety     Clonic hemifacial spasm of muscle of left side of face     Migraines      Social History:    Social History     Tobacco Use   Smoking Status Former    Current packs/day: 0.00    Types: Cigarettes    Quit date: 2015    Years since quittin.8   Smokeless Tobacco Never   Tobacco Comments    social     Current Medications:  Current

## 2024-04-01 ENCOUNTER — HOSPITAL ENCOUNTER (OUTPATIENT)
Dept: CT IMAGING | Age: 49
Discharge: HOME | End: 2024-04-01
Payer: COMMERCIAL

## 2024-04-01 VITALS — SYSTOLIC BLOOD PRESSURE: 138 MMHG | HEART RATE: 80 BPM | DIASTOLIC BLOOD PRESSURE: 92 MMHG

## 2024-04-01 DIAGNOSIS — R07.9 CHEST PAIN, UNSPECIFIED TYPE: ICD-10-CM

## 2024-04-01 DIAGNOSIS — R06.02 SOB (SHORTNESS OF BREATH): ICD-10-CM

## 2024-04-01 PROCEDURE — 6370000000 HC RX 637 (ALT 250 FOR IP): Performed by: STUDENT IN AN ORGANIZED HEALTH CARE EDUCATION/TRAINING PROGRAM

## 2024-04-01 PROCEDURE — 75574 CT ANGIO HRT W/3D IMAGE: CPT

## 2024-04-01 PROCEDURE — 6360000004 HC RX CONTRAST MEDICATION: Performed by: NURSE PRACTITIONER

## 2024-04-01 PROCEDURE — 2500000003 HC RX 250 WO HCPCS: Performed by: STUDENT IN AN ORGANIZED HEALTH CARE EDUCATION/TRAINING PROGRAM

## 2024-04-01 RX ORDER — NITROGLYCERIN 0.4 MG/1
0.4 TABLET SUBLINGUAL ONCE
Status: COMPLETED | OUTPATIENT
Start: 2024-04-01 | End: 2024-04-01

## 2024-04-01 RX ORDER — METOPROLOL TARTRATE 1 MG/ML
5 INJECTION, SOLUTION INTRAVENOUS EVERY 5 MIN PRN
Status: COMPLETED | OUTPATIENT
Start: 2024-04-01 | End: 2024-04-01

## 2024-04-01 RX ADMIN — NITROGLYCERIN 0.4 MG: 0.4 TABLET, ORALLY DISINTEGRATING SUBLINGUAL at 13:34

## 2024-04-01 RX ADMIN — METOPROLOL TARTRATE 5 MG: 1 INJECTION, SOLUTION INTRAVENOUS at 13:21

## 2024-04-01 RX ADMIN — IOPAMIDOL 100 ML: 755 INJECTION, SOLUTION INTRAVENOUS at 13:34

## 2024-04-01 RX ADMIN — METOPROLOL TARTRATE 5 MG: 1 INJECTION, SOLUTION INTRAVENOUS at 13:13

## 2024-04-01 RX ADMIN — METOPROLOL TARTRATE 5 MG: 1 INJECTION, SOLUTION INTRAVENOUS at 13:27

## 2024-04-01 NOTE — FLOWSHEET NOTE
Patient arrived alert and orientated x 4, ambulatory, denies pain.   CTA Coronary study done without complication. See flow sheets for VS.   Patient was discharged ambulatory in stable condition.

## 2024-04-05 ENCOUNTER — TELEPHONE (OUTPATIENT)
Dept: CARDIOLOGY CLINIC | Age: 49
End: 2024-04-05

## (undated) DEVICE — SET GRAV VENT NVENT CK VLV 3 NDL FREE PRT 10 GTT

## (undated) DEVICE — SET ADMIN PRIMING 7ML L30IN 7.35LB 20 GTT 2ND RLER CLMP

## (undated) DEVICE — 1200CC HIFLOW SUCTION CANISTER WITH AEROSTAT FILTER, FLOAT VALVE SHUTOFF WITH GREEN LID: Brand: BEMIS

## (undated) DEVICE — SOLUTION IV IRRIG 500ML 0.9% SODIUM CHL 2F7123

## (undated) DEVICE — GLOVE,SURG,SENSICARE,ALOE,LF,PF,7: Brand: MEDLINE

## (undated) DEVICE — 3M™ TEGADERM™ TRANSPARENT FILM DRESSING FRAME STYLE, 1624W, 2-3/8 IN X 2-3/4 IN (6 CM X 7 CM), 100/CT 4CT/CASE: Brand: 3M™ TEGADERM™

## (undated) DEVICE — BLADE MYR OFFSET 45DEG SPEAR TIP NAR SHFT W/ RND KNURLED

## (undated) DEVICE — GLOVE SURG SZ 65 L12IN FNGR THK94MIL STD WHT LTX FREE

## (undated) DEVICE — CATHETER IV 20GA L1.25IN PNK FEP SFTY STR HUB RADPQ DISP

## (undated) DEVICE — TOWEL,OR,DSP,ST,BLUE,STD,4/PK,20PK/CS: Brand: MEDLINE

## (undated) DEVICE — ELECTRODE,ECG,STRESS,FOAM,3PK: Brand: MEDLINE

## (undated) DEVICE — MEDI-VAC NON-CONDUCTIVE SUCTION TUBING: Brand: CARDINAL HEALTH

## (undated) DEVICE — X-RAY DETECTABLE SPONGES,16 PLY: Brand: VISTEC

## (undated) DEVICE — COTTON BALLS: Brand: DEROYAL

## (undated) DEVICE — SOLUTION IV 1000ML LAC RINGERS PH 6.5 INJ USP VIAFLX PLAS